# Patient Record
Sex: MALE | Race: WHITE | Employment: UNEMPLOYED | URBAN - METROPOLITAN AREA
[De-identification: names, ages, dates, MRNs, and addresses within clinical notes are randomized per-mention and may not be internally consistent; named-entity substitution may affect disease eponyms.]

---

## 2017-01-11 RX ORDER — ALPRAZOLAM 1 MG/1
1 TABLET ORAL 4 TIMES DAILY
Qty: 120 TABLET | Refills: 5 | Status: SHIPPED | OUTPATIENT
Start: 2017-01-11 | End: 2017-02-10

## 2017-01-16 ENCOUNTER — TELEPHONE (OUTPATIENT)
Dept: INTERNAL MEDICINE | Age: 62
End: 2017-01-16

## 2017-02-03 ENCOUNTER — TELEPHONE (OUTPATIENT)
Dept: INTERNAL MEDICINE CLINIC | Age: 62
End: 2017-02-03

## 2017-03-09 ENCOUNTER — TELEPHONE (OUTPATIENT)
Dept: INTERNAL MEDICINE | Age: 62
End: 2017-03-09

## 2017-03-14 ENCOUNTER — TELEPHONE (OUTPATIENT)
Dept: OTHER | Facility: CLINIC | Age: 62
End: 2017-03-14

## 2017-07-10 ENCOUNTER — TELEPHONE (OUTPATIENT)
Dept: INTERNAL MEDICINE CLINIC | Age: 62
End: 2017-07-10

## 2017-09-22 ENCOUNTER — TELEPHONE (OUTPATIENT)
Dept: INTERNAL MEDICINE | Age: 62
End: 2017-09-22

## 2017-09-28 ENCOUNTER — TELEPHONE (OUTPATIENT)
Dept: INTERNAL MEDICINE CLINIC | Age: 62
End: 2017-09-28

## 2017-11-22 ENCOUNTER — TELEPHONE (OUTPATIENT)
Dept: INTERNAL MEDICINE CLINIC | Age: 62
End: 2017-11-22

## 2017-12-13 ENCOUNTER — TELEPHONE (OUTPATIENT)
Dept: INTERNAL MEDICINE CLINIC | Age: 62
End: 2017-12-13

## 2018-01-16 RX ORDER — DRONABINOL 2.5 MG/1
2.5 CAPSULE ORAL
Qty: 180 CAPSULE | Refills: 0 | Status: SHIPPED | OUTPATIENT
Start: 2018-01-16 | End: 2018-04-16

## 2018-03-01 DIAGNOSIS — Z79.01 LONG TERM CURRENT USE OF ANTICOAGULANT: Primary | ICD-10-CM

## 2018-03-01 DIAGNOSIS — Z86.711 HISTORY OF PULMONARY EMBOLISM: ICD-10-CM

## 2018-04-16 DIAGNOSIS — F41.1 GENERALIZED ANXIETY DISORDER: Primary | ICD-10-CM

## 2018-04-16 RX ORDER — ALPRAZOLAM 1 MG/1
1 TABLET ORAL EVERY 6 HOURS
Qty: 120 TABLET | Refills: 5 | Status: SHIPPED | OUTPATIENT
Start: 2018-04-16 | End: 2018-08-20 | Stop reason: SDUPTHER

## 2018-05-01 ENCOUNTER — TELEPHONE (OUTPATIENT)
Dept: ORTHOPEDIC SURGERY | Age: 63
End: 2018-05-01

## 2018-08-20 DIAGNOSIS — F41.1 GENERALIZED ANXIETY DISORDER: ICD-10-CM

## 2018-08-20 RX ORDER — ALPRAZOLAM 1 MG/1
1 TABLET ORAL EVERY 6 HOURS
Qty: 120 TABLET | Refills: 5 | Status: SHIPPED | OUTPATIENT
Start: 2018-08-20 | End: 2019-08-20 | Stop reason: SDUPTHER

## 2019-08-20 DIAGNOSIS — F41.1 GENERALIZED ANXIETY DISORDER: ICD-10-CM

## 2019-08-20 RX ORDER — ALPRAZOLAM 1 MG/1
1 TABLET ORAL EVERY 6 HOURS
Qty: 120 TABLET | Refills: 5 | Status: SHIPPED | OUTPATIENT
Start: 2019-08-20 | End: 2020-03-25 | Stop reason: SDUPTHER

## 2019-11-17 PROBLEM — E78.5 DYSLIPIDEMIA: Status: ACTIVE | Noted: 2019-11-17

## 2019-11-17 PROBLEM — F42.2 MIXED OBSESSIONAL THOUGHTS AND ACTS: Status: ACTIVE | Noted: 2019-11-17

## 2020-03-25 RX ORDER — ALPRAZOLAM 1 MG/1
1 TABLET ORAL EVERY 6 HOURS
Qty: 120 TABLET | Refills: 5 | Status: SHIPPED | OUTPATIENT
Start: 2020-03-25 | End: 2020-09-20 | Stop reason: SDUPTHER

## 2020-07-05 ENCOUNTER — OUTSIDE SERVICES (OUTPATIENT)
Dept: INTERNAL MEDICINE CLINIC | Age: 65
End: 2020-07-05
Payer: MEDICARE

## 2020-07-05 VITALS
HEART RATE: 85 BPM | RESPIRATION RATE: 18 BRPM | DIASTOLIC BLOOD PRESSURE: 68 MMHG | OXYGEN SATURATION: 96 % | WEIGHT: 194 LBS | SYSTOLIC BLOOD PRESSURE: 95 MMHG | BODY MASS INDEX: 28.65 KG/M2 | TEMPERATURE: 97.8 F

## 2020-07-05 PROBLEM — E55.9 VITAMIN D DEFICIENCY: Status: ACTIVE | Noted: 2020-07-05

## 2020-07-05 PROBLEM — Z79.01 LONG TERM CURRENT USE OF ANTICOAGULANT: Status: RESOLVED | Noted: 2018-03-01 | Resolved: 2020-07-05

## 2020-07-05 PROCEDURE — 99308 SBSQ NF CARE LOW MDM 20: CPT | Performed by: INTERNAL MEDICINE

## 2020-07-05 RX ORDER — PRAVASTATIN SODIUM 40 MG
40 TABLET ORAL NIGHTLY
COMMUNITY

## 2020-07-05 RX ORDER — POLYETHYLENE GLYCOL 3350 17 G/17G
17 POWDER, FOR SOLUTION ORAL 2 TIMES DAILY PRN
COMMUNITY
End: 2020-10-29

## 2020-07-05 RX ORDER — PAROXETINE HYDROCHLORIDE 20 MG/1
20 TABLET, FILM COATED ORAL SEE ADMIN INSTRUCTIONS
COMMUNITY
End: 2020-10-29 | Stop reason: ALTCHOICE

## 2020-07-05 RX ORDER — ASPIRIN 81 MG/1
81 TABLET ORAL DAILY
COMMUNITY
End: 2021-02-13 | Stop reason: ALTCHOICE

## 2020-07-05 RX ORDER — PANTOPRAZOLE SODIUM 20 MG/1
20 TABLET, DELAYED RELEASE ORAL DAILY
COMMUNITY

## 2020-07-05 RX ORDER — LANOLIN ALCOHOL/MO/W.PET/CERES
6 CREAM (GRAM) TOPICAL NIGHTLY
COMMUNITY

## 2020-07-05 RX ORDER — QUETIAPINE FUMARATE 100 MG/1
100 TABLET, FILM COATED ORAL 2 TIMES DAILY
COMMUNITY

## 2020-07-05 RX ORDER — ACETAMINOPHEN 325 MG/1
650 TABLET ORAL EVERY 6 HOURS PRN
COMMUNITY

## 2020-07-05 RX ORDER — FINASTERIDE 5 MG/1
5 TABLET, FILM COATED ORAL DAILY
COMMUNITY

## 2020-07-05 NOTE — PROGRESS NOTES
[facility]) Physicians  Internal Medicine  Patient Encounter  Tamra Sánchez D.O., [facility]        Chief Complaint   Patient presents with   Robert Reyes    Medication Check       HPI: 59 y.o. male seen today for his routine 2-month check. Patient's clinical status remains unchanged. He remains seclusive in his room but does go for walks and spent some time outside. He has continued intermittent periods of agitation. He also has frequent episodes of excessive thoughts and actions. We will call the staff repetitively but this usually occurs when he is agitated  or wants something. He has very little patience and does not like to wait. He offers no other specific complaints. He eats all meals in his room. He is able to toilet himself independently. He has not had any recent falls. Specifically he denies any chest pain, shortness of breath, lightheadedness or syncopal episodes. He denies any abdominal pain, nausea, vomiting, diarrhea. He does have a history of urinary retention but he denies any recent problems with urination. Medication reconciliation performed. His Paxil dose was listed as 80 mg in the morning and 20 mg at bedtime. It seems that psychiatry had him on 40 mg twice daily and 20 mg at bedtime. We will need to confirm with psychiatry his dosing regimen. Additionally his insurance company has indicated that Xanax is actually not on the formulary. He continues to receive the medicine as prescribed.         Past Medical History:   Diagnosis Date    CIDP (chronic inflammatory demyelinating polyneuropathy) (HCC)     Clostridium difficile colitis 2017    Constipation     Deep vein thrombosis (DVT) of left lower extremity ([facility] [address] 08/2018    Dementia with behavioral disturbance (HCC)     Depression with anxiety     Discitis of cervical region 08/2017    Dyslipidemia     E coli bacteremia 01/24/2020    AMARJIT (generalized anxiety disorder)     OCD (obsessive compulsive disorder)     Overactive bladder     Primary insomnia     Pulmonary embolism ([facility] [address] 08/2017    Pyelonephritis of right kidney 01/24/2020    with sepsis    Rhabdomyolysis 11/05/2016    Urinary retention 11/8/2016    Urinary retention 11/05/2016    Vitamin D deficiency          MEDICATIONS:  Medication Sig   aspirin 81 MG EC tablet Take 81 mg by mouth daily   Calcium Carbonate Antacid 400 MG CHEW Take 1 tablet by mouth 2 times daily   finasteride (PROSCAR) 5 MG tablet Take 5 mg by mouth daily   melatonin 3 MG TABS tablet Take 6 mg by mouth nightly   polyethylene glycol (GLYCOLAX) 17 GM/SCOOP powder Take 17 g by mouth 2 times daily as needed   pantoprazole (PROTONIX) 20 MG tablet Take 20 mg by mouth daily   PARoxetine (PAXIL) 20 MG tablet Take 20 mg by mouth See Admin Instructions 40 mg BID and 20 mg at hs   pravastatin (PRAVACHOL) 40 MG tablet Take 40 mg by mouth nightly   QUEtiapine (SEROQUEL) 100 MG tablet Take 100 mg by mouth 2 times daily   acetaminophen (TYLENOL) 325 MG tablet Take 650 mg by mouth every 6 hours as needed for Pain   vitamin D (CHOLECALCIFEROL) 25 MCG (1000 UT) TABS tablet Take 1,000 Units by mouth daily   ALPRAZolam (XANAX) 1 MG tablet Take 1 tablet by mouth every 6 hours for 180 days. .  May take 1 additional tab as needed for anxiety   Multiple Vitamins-Minerals (CENTRUM SILVER) TABS Take by mouth daily           Review of Systems - As per HPI  GEN: Pt denies fever, chills or night sweats. Denies weight changes. Appetite good  HEENT: Pt denies visual and auditory changes, epistaxis, upper respiratory symptoms and dysphagia  CV: Pt denies CP, SOB, [place], orthopnea palpitations, LE swelling, and claudication. PULM: Pt denies cough, wheezing or sputum production  GI: Pt denies N/V/D, heart burn, rectal bleeding, or melena. NEURO: Pt denies unilateral weakness, paresthesia and dizziness.     OBJECTIVE:  BP 95/68   Pulse 85   Temp 97.8 °F (36.6 °C)   Resp 18   Wt 194 lb (88 kg)   SpO2 96%   BMI 28.65 kg/m²   GEN: NAD, A&O, Non-toxic. Follows commands. HEENT: NC/AT, PARISH, EOMI, Oral cavity Clear,  Mucous membranes moist.   NECK: Supple. No thyromegaly. No JVD  LYMPH: No C/SC nodes. CV: S1 S2 NL, RRR. No murmurs, clicks or rubs. PULM: CTA, symmentric air exchange  EXT: No edema. GI: Abdomen is soft, NT, BS+, No hepatomegaly. No masses. NEURO: BL LE weakness. BL LE muscle atrophy. Gait not assessed today. However he has been observed walking in the hallway with his walker. He slaps his feet when walking. VASC:  No carotid bruits. Pulses symmetric  SKIN:  No rashes or lesions of concern    ASSESSMENT[de-identified]  Page Coronel was seen today for check-up and medication check. Diagnoses and all orders for this visit:    CIDP (chronic inflammatory demyelinating polyneuropathy) ([facility] [address]    Generalized anxiety disorder    Dyslipidemia    Mixed obsessional thoughts and acts    Dementia with behavioral disturbance, unspecified dementia type ([facility] [address]    Vitamin D deficiency    Hypotension, unspecified hypotension type        Additional Plan:  1. Lab tests next visit  2. Push fluids  3. Continue to monitor BP. 4. Confirm with psych his Paxil dose. Time-- 35 minutes    Discussed medications with patient who voiced understanding of their use, indication and potential side effects. Pt also understands the above recommendations. All questions answered. This note was generated completely or in part utilizing Dragon dictation speech recognition software. Occasionally, words are mistranscribed and despite editing, the text may contain inaccuracies due to incorrect word recognition.   If further clarification is needed please contact the office at [phone]         Electronically signed    Elena Easley D.O.

## 2020-09-20 RX ORDER — ALPRAZOLAM 1 MG/1
1 TABLET ORAL EVERY 6 HOURS
Qty: 120 TABLET | Refills: 5 | Status: SHIPPED | OUTPATIENT
Start: 2020-09-20 | End: 2021-03-28 | Stop reason: SDUPTHER

## 2020-10-29 ENCOUNTER — OUTSIDE SERVICES (OUTPATIENT)
Dept: INTERNAL MEDICINE CLINIC | Age: 65
End: 2020-10-29
Payer: MEDICARE

## 2020-10-29 VITALS
OXYGEN SATURATION: 95 % | HEART RATE: 68 BPM | DIASTOLIC BLOOD PRESSURE: 68 MMHG | WEIGHT: 197 LBS | BODY MASS INDEX: 29.09 KG/M2 | TEMPERATURE: 98.1 F | SYSTOLIC BLOOD PRESSURE: 104 MMHG | RESPIRATION RATE: 18 BRPM

## 2020-10-29 PROCEDURE — 99308 SBSQ NF CARE LOW MDM 20: CPT | Performed by: INTERNAL MEDICINE

## 2020-10-29 RX ORDER — PAROXETINE HYDROCHLORIDE 40 MG/1
60 TABLET, FILM COATED ORAL EVERY MORNING
COMMUNITY
End: 2020-10-29 | Stop reason: CLARIF

## 2020-10-29 RX ORDER — PHENOL 1.4 %
1 AEROSOL, SPRAY (ML) MUCOUS MEMBRANE 2 TIMES DAILY
Status: ON HOLD | COMMUNITY
End: 2021-02-15 | Stop reason: CLARIF

## 2020-10-29 RX ORDER — PAROXETINE HYDROCHLORIDE 20 MG/1
20 TABLET, FILM COATED ORAL SEE ADMIN INSTRUCTIONS
Status: ON HOLD | COMMUNITY
End: 2021-02-15 | Stop reason: CLARIF

## 2020-10-29 RX ORDER — POLYETHYLENE GLYCOL 3350 17 G/17G
17 POWDER, FOR SOLUTION ORAL DAILY PRN
COMMUNITY

## 2020-10-29 NOTE — PROGRESS NOTES
[facility]) Physicians  Internal Medicine  Patient Encounter  Nolberto Mayen D.O., [facility]        Chief Complaint   Patient presents with   Michelle Ayala    Medication Check       HPI: 59 y.o. male seen today for his routine checkup regarding status of current chronic medical issues listed below along with a medication review and reconciliation. Overall, his clinical status has remained about the same. He has been increasingly more agitated due to the pandemic and increased social isolation. His movement in and out of his room has been restricted which causes increased irritation and agitation. He does get out about 10 to 15 minutes daily for a walk. Staff are required to chaperone. Patient states he is going crazy being \"locked up in his room. \"  He wants to be able to get out and walk when he wants. Patient was recently seen by psychiatry. His Paxil dose was reduced to 60 mg in the morning and 20 mg in the evening. His Seroquel dose and Xanax dose remain the same. He has not needed the additional as needed dose of Xanax during the day. Staff report that he tolerates the medication well. He is not found to be overly sedated. Patient also expressed some frustration over not being able to see his family. He is also irritated by the fact that they will not buy him again. Otherwise, he eats well. He denies any problems with abdominal pain, nausea, vomiting, diarrhea. He denies any chest pain or shortness of breath. He denies any neck or back problems. Patient also denies any dysuria or urinary incontinence. Patient does state that he feels constipated but denies any bloating. He does have an order for MiraLAX as needed but it does not look like he has been tested for it. He is not on a stool softener.       Past Medical History:   Diagnosis Date    CIDP (chronic inflammatory demyelinating polyneuropathy) ([facility])     Clostridium difficile colitis 2017    Constipation     Deep vein BP Readings from Last 3 Encounters:   10/29/20 104/68   07/05/20 95/68   11/11/16 119/65      GEN: NAD, A&O, Non-toxic. Follows commands. HEENT: NC/AT, PARISH, EOMI, Oral cavity Clear,  Mucous membranes moist.    NECK: Supple. No thyromegaly. No JVD  LYMPH: No C/SC nodes. CV: S1 S2 NL, RRR. No murmurs, clicks or rubs. PULM: CTA, symmentric air exchange  EXT: No edema. GI: Abdomen is soft, NT, BS+, No hepatomegaly. No masses. NEURO: BL LE weakness. BL LE muscle atrophy. VASC:  No carotid bruits. Pedal pulses symmetrical  SKIN:  No rashes    ASSESSMENT[de-identified]  Greyson Cardoso was seen today for check-up and medication check. Diagnoses and all orders for this visit:    CIDP (chronic inflammatory demyelinating polyneuropathy) ([facility] [address]    Dyslipidemia    Vitamin D deficiency    Dementia with behavioral disturbance, unspecified dementia type ([facility] [address]    Mixed obsessional thoughts and acts    Generalized anxiety disorder    Nursing home resident    Screen for colon cancer    Need for hepatitis C screening test        Additional Plan:  1. LAB with Hep C Antibody  2. Stool FIT for colon cancer screening if he allows. With Wayne's comorbidities, putting him through a colonoscopy would likely cause undue anxiety and distress. Will screen for colon cancer via fecal immunochemical test.  If positive will need to consider colonoscopy for further screening        Discussed medications with patient who voiced understanding of their use, indication and potential side effects. Pt also understands the above recommendations. All questions answered. This note was generated completely or in part utilizing Dragon dictation speech recognition software. Occasionally, words are mistranscribed and despite editing, the text may contain inaccuracies due to incorrect word recognition.   If further clarification is needed please contact the office at [phone]         Electronically signed    Renee Duong D.O.

## 2020-11-02 LAB — HEP C VIRUS AB: NONREACTIVE

## 2020-11-23 LAB — FECAL BLOOD IMMUNOCHEMICAL TEST: NORMAL

## 2020-11-27 ENCOUNTER — OUTSIDE SERVICES (OUTPATIENT)
Dept: INTERNAL MEDICINE CLINIC | Age: 65
End: 2020-11-27
Payer: MEDICARE

## 2020-11-27 VITALS — SYSTOLIC BLOOD PRESSURE: 110 MMHG | DIASTOLIC BLOOD PRESSURE: 60 MMHG | HEART RATE: 70 BPM

## 2020-11-27 PROCEDURE — 99308 SBSQ NF CARE LOW MDM 20: CPT | Performed by: INTERNAL MEDICINE

## 2020-11-27 NOTE — PROGRESS NOTES
[facility]) Physicians  Internal Medicine  Patient Encounter  Shaye Davis D.O., [facility]         Chief Complaint   Patient presents with    Results       HPI: 59 y.o. male seen today to review lab tests and stool tests. The patient has a mild microcytic anemia. His hemoglobin was normal in August 2020 at 14.5. In September a month later his hemoglobin had dropped to 12.9 with an MCV of 79. In October he dropped further with a hemoglobin of 11.7. His hemoglobin improved slightly on 11/6/2000 2212.3 with an MCV of 79. FIT was positive. Questioning the patient he states he occasionally sees a drop of blood on the toilet tissue or in the water. He thinks he has hemorrhoids. He does state that he had a colonoscopy many years ago perhaps when he was around 48years old. He has not had a colonoscopy since. He denies any appetite problems or weight loss. He denies abdominal pain. Past Medical History:   Diagnosis Date    CIDP (chronic inflammatory demyelinating polyneuropathy) (HCC)     Clostridium difficile colitis 2017    Constipation     Deep vein thrombosis (DVT) of left lower extremity ([address] 08/2018    Dementia with behavioral disturbance ([address]     Depression with anxiety     Discitis of cervical region 08/2017    Dyslipidemia     E coli bacteremia 01/24/2020    AMARJIT (generalized anxiety disorder)     OCD (obsessive compulsive disorder)     Overactive bladder     Primary insomnia     Pulmonary embolism ([address] 08/2017    Pyelonephritis of right kidney 01/24/2020    with sepsis    Rhabdomyolysis 11/05/2016    Urinary retention 11/05/2016    Vitamin D deficiency          MEDICATIONS:  Medication Sig   polyethylene glycol (GLYCOLAX) 17 GM/SCOOP powder Take 17 g by mouth daily as needed   calcium carbonate 600 MG TABS tablet Take 1 tablet by mouth 2 times daily   PARoxetine (PAXIL) 20 MG tablet Take 20 mg by mouth See Admin Instructions Take 3 in the morning and 1 in the evening. ALPRAZolam (XANAX) 1 MG tablet Take 1 tablet by mouth every 6 hours for 180 days. .  May take 1 additional tab as needed for anxiety   aspirin 81 MG EC tablet Take 81 mg by mouth daily   finasteride (PROSCAR) 5 MG tablet Take 5 mg by mouth daily   melatonin 3 MG TABS tablet Take 6 mg by mouth nightly   pantoprazole (PROTONIX) 20 MG tablet Take 20 mg by mouth daily   pravastatin (PRAVACHOL) 40 MG tablet Take 40 mg by mouth nightly   QUEtiapine (SEROQUEL) 100 MG tablet Take 100 mg by mouth 2 times daily   acetaminophen (TYLENOL) 325 MG tablet Take 650 mg by mouth every 6 hours as needed for Pain   vitamin D (CHOLECALCIFEROL) 25 MCG (1000 UT) TABS tablet Take 1,000 Units by mouth daily   Multiple Vitamins-Minerals (CENTRUM SILVER) TABS Take by mouth daily           Review of Systems - As per HPI      OBJECTIVE:  /60   Pulse 70   GEN: NAD, A&O, Non-toxic  HEENT: NC/AT, PARISH, EOMI, nbo conjuntival pallor,   EXT: No C/C/E  GI: Abdomen is soft, NT, BS+, No hepatomegaly. No masses    ASSESSMENT[de-identified]  Blanquita Mata was seen today for results. Diagnoses and all orders for this visit:    Positive FIT (fecal immunochemical test)    Microcytic anemia        Additional Plan:  1. Schedule Colonoscopy in the next few months. Spoke with pt. He is in favor  2. Continue serial lab  3. May need iron supplement      20 minutes spent with the pt.  >50% spent reviewing test results and discussing plan of care and counseled on anemia, colon cancer screenning. Spoke with family and advised of plan of care. They agree. This note was generated completely or in part utilizing Dragon dictation speech recognition software. Occasionally, words are mistranscribed and despite editing, the text may contain inaccuracies due to incorrect word recognition.   If further clarification is needed please contact the office at [phone]       Electronically signed    Lauren Bradford D.O.

## 2021-01-03 ENCOUNTER — OUTSIDE SERVICES (OUTPATIENT)
Dept: INTERNAL MEDICINE CLINIC | Age: 66
End: 2021-01-03
Payer: MEDICARE

## 2021-01-03 VITALS
WEIGHT: 197 LBS | RESPIRATION RATE: 17 BRPM | SYSTOLIC BLOOD PRESSURE: 126 MMHG | HEART RATE: 79 BPM | OXYGEN SATURATION: 96 % | BODY MASS INDEX: 29.09 KG/M2 | TEMPERATURE: 97.7 F | DIASTOLIC BLOOD PRESSURE: 74 MMHG

## 2021-01-03 DIAGNOSIS — F41.1 GENERALIZED ANXIETY DISORDER: ICD-10-CM

## 2021-01-03 DIAGNOSIS — R19.5 POSITIVE FIT (FECAL IMMUNOCHEMICAL TEST): ICD-10-CM

## 2021-01-03 DIAGNOSIS — F03.91 DEMENTIA WITH BEHAVIORAL DISTURBANCE, UNSPECIFIED DEMENTIA TYPE: ICD-10-CM

## 2021-01-03 DIAGNOSIS — F42.2 MIXED OBSESSIONAL THOUGHTS AND ACTS: ICD-10-CM

## 2021-01-03 DIAGNOSIS — E78.5 DYSLIPIDEMIA: ICD-10-CM

## 2021-01-03 DIAGNOSIS — G61.81 CIDP (CHRONIC INFLAMMATORY DEMYELINATING POLYNEUROPATHY) (HCC): Primary | ICD-10-CM

## 2021-01-03 DIAGNOSIS — E78.5 HYPERLIPIDEMIA, UNSPECIFIED HYPERLIPIDEMIA TYPE: ICD-10-CM

## 2021-01-03 DIAGNOSIS — D50.9 MICROCYTIC ANEMIA: ICD-10-CM

## 2021-01-03 PROCEDURE — 99308 SBSQ NF CARE LOW MDM 20: CPT | Performed by: INTERNAL MEDICINE

## 2021-01-03 NOTE — PROGRESS NOTES
[facility]) Physicians  Internal Medicine  Patient Encounter  Mauro Alves D.O., [facility]        Chief Complaint   Patient presents with   Kathleen Saavedra    Medication Check       HPI: 72 y.o. male seen today for his routine checkup regarding status of current chronic medical issues listed below along with a medication review and reconciliation. Pt states he is feeling about the same. No new changes reported by staff. Depression/anxiety/OCD--patient is currently under the care of Dr. Ernesto Zuniga. He remains on Xanax and Paxil as well as Seroquel. He has not had any adverse effects. The patient has undergone full neuropsychological testing done on 4/27/2018 with a formal diagnosis of frontotemporal lobe dementia due to chronic inflammatory demyelinating polyneuropathy as well as an unspecified anxiety disorder. Additionally the patient has diagnoses of obsessive-compulsive disorder. Generally, the patient admits to feeling depressed. He does not like the social isolation that the coronavirus pandemic has created. CIDP--Patient has not had any falls lately. He does have ongoing lower extremity weakness but manages to ambulate with his walker in his room. He has not been out of his room much like he used to. BPH/lower urinary tract obstruction--Patient has a history of urinary retention. He does report urinary frequency but has not had any issues with retention in the last couple of years. He remains on finasteride. He is not on Flomax at this time    GERD--Patient denies any heartburn or dysphagia. He is on Protonix 20 mg daily. This works well. Hyperlipidemia--patient remains on pravastatin without adverse side effects. He denies any new myalgias. Chronic constipation--patient is doing well with the MiraLAX. He takes 17 g daily. Denies any abdominal pain, nausea, bloating, melena. He does occasionally have blood on the stool. Multiple Vitamins-Minerals (CENTRUM SILVER) TABS Take by mouth daily              Review of Systems - As per HPI      OBJECTIVE:  Vitals:    01/03/21 1120   BP: 126/74   Pulse: 79   Resp: 17   Temp: 97.7 °F (36.5 °C)   SpO2: 96%   Weight: 197 lb (89.4 kg)     Body mass index is 29.09 kg/m². Wt Readings from Last 3 Encounters:   01/03/21 197 lb (89.4 kg)   10/29/20 197 lb (89.4 kg)   07/05/20 194 lb (88 kg)     BP Readings from Last 3 Encounters:   01/03/21 126/74   11/27/20 110/60   10/29/20 104/68      GEN: NAD, A&O, Non-toxic. Follows commands. HEENT:  NC/AT, PARISH, EOMI, oral cavity clear, tongue midline. NECK: Supple. No thyromegaly. No masses. No JVD  LYMPH: No C/SC nodes. CV: Regular rhythm. Rate normal.  No murmurs. No ectopy. PULM: CTA  EXT: No edema. GI: Abdomen is soft, NT, BS+, No hepatomegaly. No masses. NEURO: BL LE weakness. BL LE muscle atrophy. No changes   VASC:  No carotid bruits. Pedal pulses symmetrical  SKIN:  No rashes  PSYCH: Mood is depressed. Affect flat. Psychomotor activity is depressed. ASSESSMENT[de-identified]  Aileen Pelaez was seen today for check-up and medication check. Diagnoses and all orders for this visit:    CIDP (chronic inflammatory demyelinating polyneuropathy) ([facility] [address]    Dementia with behavioral disturbance, unspecified dementia type ([facility] [address]    Generalized anxiety disorder    Mixed obsessional thoughts and acts    Dyslipidemia    Microcytic anemia    Positive FIT (fecal immunochemical test)    Hyperlipidemia, unspecified hyperlipidemia type        Additional Plan:  1. Await GI consultation and colonoscopy  2. LAB in Feb  3. Psychotropics per Dr. Saúl Mckeon        Discussed medications with patient who voiced understanding of their use, indication and potential side effects. Pt also understands the above recommendations. All questions answered. This note was generated completely or in part utilizing Dragon dictation speech recognition software. Occasionally, words are mistranscribed and despite editing, the text may contain inaccuracies due to incorrect word recognition.   If further clarification is needed please contact the office at [phone]         Electronically signed    Sobia Diehl D.O.

## 2021-02-02 ENCOUNTER — ANESTHESIA EVENT (OUTPATIENT)
Dept: ENDOSCOPY | Age: 66
End: 2021-02-02
Payer: MEDICARE

## 2021-02-03 ENCOUNTER — ANESTHESIA (OUTPATIENT)
Dept: ENDOSCOPY | Age: 66
End: 2021-02-03
Payer: MEDICARE

## 2021-02-03 ENCOUNTER — HOSPITAL ENCOUNTER (OUTPATIENT)
Age: 66
Setting detail: OUTPATIENT SURGERY
Discharge: HOME | End: 2021-02-03
Attending: INTERNAL MEDICINE | Admitting: INTERNAL MEDICINE
Payer: MEDICARE

## 2021-02-03 VITALS — DIASTOLIC BLOOD PRESSURE: 55 MMHG | SYSTOLIC BLOOD PRESSURE: 97 MMHG | OXYGEN SATURATION: 98 %

## 2021-02-03 VITALS
DIASTOLIC BLOOD PRESSURE: 76 MMHG | HEART RATE: 71 BPM | RESPIRATION RATE: 18 BRPM | SYSTOLIC BLOOD PRESSURE: 147 MMHG | OXYGEN SATURATION: 98 %

## 2021-02-03 DIAGNOSIS — R19.5 POSITIVE FIT (FECAL IMMUNOCHEMICAL TEST): ICD-10-CM

## 2021-02-03 PROCEDURE — 88305 TISSUE EXAM BY PATHOLOGIST: CPT

## 2021-02-03 PROCEDURE — 7100000011 HC PHASE II RECOVERY - ADDTL 15 MIN: Performed by: INTERNAL MEDICINE

## 2021-02-03 PROCEDURE — 6360000002 HC RX W HCPCS: Performed by: NURSE ANESTHETIST, CERTIFIED REGISTERED

## 2021-02-03 PROCEDURE — 2709999900 HC NON-CHARGEABLE SUPPLY: Performed by: INTERNAL MEDICINE

## 2021-02-03 PROCEDURE — 2500000003 HC RX 250 WO HCPCS: Performed by: NURSE ANESTHETIST, CERTIFIED REGISTERED

## 2021-02-03 PROCEDURE — 3700000000 HC ANESTHESIA ATTENDED CARE: Performed by: INTERNAL MEDICINE

## 2021-02-03 PROCEDURE — 2580000003 HC RX 258: Performed by: NURSE ANESTHETIST, CERTIFIED REGISTERED

## 2021-02-03 PROCEDURE — 7100000010 HC PHASE II RECOVERY - FIRST 15 MIN: Performed by: INTERNAL MEDICINE

## 2021-02-03 PROCEDURE — 3700000001 HC ADD 15 MINUTES (ANESTHESIA): Performed by: INTERNAL MEDICINE

## 2021-02-03 PROCEDURE — 3609010600 HC COLONOSCOPY POLYPECTOMY SNARE/COLD BIOPSY: Performed by: INTERNAL MEDICINE

## 2021-02-03 RX ORDER — SODIUM CHLORIDE, SODIUM LACTATE, POTASSIUM CHLORIDE, CALCIUM CHLORIDE 600; 310; 30; 20 MG/100ML; MG/100ML; MG/100ML; MG/100ML
INJECTION, SOLUTION INTRAVENOUS CONTINUOUS
Status: DISCONTINUED | OUTPATIENT
Start: 2021-02-03 | End: 2021-02-03 | Stop reason: HOSPADM

## 2021-02-03 RX ORDER — PROPOFOL 10 MG/ML
INJECTION, EMULSION INTRAVENOUS PRN
Status: DISCONTINUED | OUTPATIENT
Start: 2021-02-03 | End: 2021-02-03 | Stop reason: SDUPTHER

## 2021-02-03 RX ORDER — SODIUM CHLORIDE 0.9 % (FLUSH) 0.9 %
10 SYRINGE (ML) INJECTION PRN
Status: DISCONTINUED | OUTPATIENT
Start: 2021-02-03 | End: 2021-02-03 | Stop reason: HOSPADM

## 2021-02-03 RX ORDER — SODIUM CHLORIDE 9 MG/ML
INJECTION, SOLUTION INTRAVENOUS CONTINUOUS
Status: DISCONTINUED | OUTPATIENT
Start: 2021-02-03 | End: 2021-02-03 | Stop reason: HOSPADM

## 2021-02-03 RX ORDER — LIDOCAINE HYDROCHLORIDE 20 MG/ML
INJECTION, SOLUTION INFILTRATION; PERINEURAL PRN
Status: DISCONTINUED | OUTPATIENT
Start: 2021-02-03 | End: 2021-02-03 | Stop reason: SDUPTHER

## 2021-02-03 RX ORDER — SODIUM CHLORIDE, SODIUM LACTATE, POTASSIUM CHLORIDE, CALCIUM CHLORIDE 600; 310; 30; 20 MG/100ML; MG/100ML; MG/100ML; MG/100ML
INJECTION, SOLUTION INTRAVENOUS CONTINUOUS PRN
Status: DISCONTINUED | OUTPATIENT
Start: 2021-02-03 | End: 2021-02-03 | Stop reason: SDUPTHER

## 2021-02-03 RX ORDER — SODIUM CHLORIDE 0.9 % (FLUSH) 0.9 %
10 SYRINGE (ML) INJECTION EVERY 12 HOURS SCHEDULED
Status: DISCONTINUED | OUTPATIENT
Start: 2021-02-03 | End: 2021-02-03 | Stop reason: HOSPADM

## 2021-02-03 RX ADMIN — PROPOFOL 20 MG: 10 INJECTION, EMULSION INTRAVENOUS at 08:43

## 2021-02-03 RX ADMIN — PHENYLEPHRINE HYDROCHLORIDE 50 MCG: 10 INJECTION, SOLUTION INTRAMUSCULAR; INTRAVENOUS; SUBCUTANEOUS at 08:32

## 2021-02-03 RX ADMIN — SODIUM CHLORIDE, SODIUM LACTATE, POTASSIUM CHLORIDE, AND CALCIUM CHLORIDE: .6; .31; .03; .02 INJECTION, SOLUTION INTRAVENOUS at 08:11

## 2021-02-03 RX ADMIN — PHENYLEPHRINE HYDROCHLORIDE 50 MCG: 10 INJECTION, SOLUTION INTRAMUSCULAR; INTRAVENOUS; SUBCUTANEOUS at 08:37

## 2021-02-03 RX ADMIN — PROPOFOL 50 MG: 10 INJECTION, EMULSION INTRAVENOUS at 08:26

## 2021-02-03 RX ADMIN — PROPOFOL 40 MG: 10 INJECTION, EMULSION INTRAVENOUS at 08:36

## 2021-02-03 RX ADMIN — PROPOFOL 50 MG: 10 INJECTION, EMULSION INTRAVENOUS at 08:21

## 2021-02-03 RX ADMIN — PROPOFOL 50 MG: 10 INJECTION, EMULSION INTRAVENOUS at 08:17

## 2021-02-03 RX ADMIN — PROPOFOL 40 MG: 10 INJECTION, EMULSION INTRAVENOUS at 08:32

## 2021-02-03 RX ADMIN — PROPOFOL 100 MG: 10 INJECTION, EMULSION INTRAVENOUS at 08:11

## 2021-02-03 RX ADMIN — PROPOFOL 50 MG: 10 INJECTION, EMULSION INTRAVENOUS at 08:13

## 2021-02-03 RX ADMIN — LIDOCAINE HYDROCHLORIDE 50 MG: 20 INJECTION, SOLUTION INFILTRATION; PERINEURAL at 08:11

## 2021-02-03 RX ADMIN — PHENYLEPHRINE HYDROCHLORIDE 50 MCG: 10 INJECTION, SOLUTION INTRAMUSCULAR; INTRAVENOUS; SUBCUTANEOUS at 08:17

## 2021-02-03 ASSESSMENT — PULMONARY FUNCTION TESTS
PIF_VALUE: 1
PIF_VALUE: 0
PIF_VALUE: 1
PIF_VALUE: 0
PIF_VALUE: 0
PIF_VALUE: 1
PIF_VALUE: 0
PIF_VALUE: 1
PIF_VALUE: 0
PIF_VALUE: 1

## 2021-02-03 ASSESSMENT — PAIN - FUNCTIONAL ASSESSMENT
PAIN_FUNCTIONAL_ASSESSMENT: 0-10
PAIN_FUNCTIONAL_ASSESSMENT: 0-10

## 2021-02-03 NOTE — ANESTHESIA POSTPROCEDURE EVALUATION
Department of Anesthesiology  Postprocedure Note    Patient: Thania Stevens  MRN: [id number]  [place]: 1955  Date of evaluation: 2/3/2021  Time:  10:46 AM     Procedure Summary     Date: 02/03/21 Room / Location: [place] [address] / [facility]    Anesthesia Start: 9925 Anesthesia Stop: 1273    Procedure: COLONOSCOPY POLYPECTOMY SNARE/COLD BIOPSY (N/A ) Diagnosis:       Positive FIT (fecal immunochemical test)      (Positive FIT (fecal immunochemical test) [R19.5])    Surgeons: Nichelle Camacho MD Responsible Provider: Teetee Wellington MD    Anesthesia Type: MAC ASA Status: 3          Anesthesia Type: MAC    Castro Phase I: Castro Score: 10    Castro Phase II: Castro Score: 8    Last vitals: Reviewed and per EMR flowsheets.        Anesthesia Post Evaluation    Patient participation: complete - patient participated  Level of consciousness: awake  Airway patency: patent  Nausea & Vomiting: no nausea and no vomiting  Complications: no  Cardiovascular status: hemodynamically stable  Respiratory status: acceptable  Hydration status: stable

## 2021-02-03 NOTE — PROGRESS NOTES
Pt requested that this writer telephone his brother to update him on findings of the procedure today and need of future surgery to have large polyp removed. This writer telephoned brother, Martin Burr, and updated as requested. This writer also telephoned [employer] at [facility] to give report. Transportation telephoned. Informed that they would not be available to pick pt up until close to noon. Pt and [facility] informed.

## 2021-02-03 NOTE — PROGRESS NOTES
Verified with [id number] [facility] about pt's diet yesterday and Angel Gonzalez the nurse said, pt was on clears yesterday. Neither ate breakfast and dinner too as what pt said. Likewise, the nurse Angel Gonzalez will fax the COVID test result shortly.

## 2021-02-03 NOTE — PROCEDURES
[address] [place] [address]  Colonoscopy Note    Patient: Wilfredo Howard  : 1955  Acct#:     Procedure: Colonoscopy with biopsy, polypectomy (cold snare), injection for tattooing     Date:  2/3/2021    Surgeon:  Norman Rodriguez MD    Referring Physician:  Mikhail Boudreaux MD    Anesthesia: IV propofol, per anesthesia    EBL: <50 mL    Indications: This is a 72y.o. year old male who presents today for colonoscopy for + FIT test.     Procedure: An informed consent was obtained from the patient after explanation of indications, benefits, possible risks and complications of the procedure. The patient was then taken to the endoscopy suite, placed in the left lateral decubitus position, and the above IV anesthesia was administered. A digital rectal examination was performed and revealed negative without mass, lesions or tenderness. The Olympus [id number]-H190 video colonoscope was placed in the patient's rectum under digital direction and advanced to the cecum. The cecum was identified by characteristic anatomy and ballottment. The prep was adequate. Findings: The scope was then withdrawn back through the colon. A 4 mm sessile polyp was found at the hepatic flexure and was removed via cold snare polypectomy. A 5 cm broad-based polypoid lesion was found in the transverse colon. The base of the lesion was explored and was determined to be too broad-based for resection. Extensive biopsies were obtained and the location was marked with injection of [place] ink for tattooing. A 5 mm sessile polyp was found in the distal transverse colon was removed via cold snare polypectomy. A 2 mm sessile polyp was found in the rectum and was removed via cold snare polypectomy. The scope was then withdrawn into the rectum and retroflexed. The retroflexed view of the anal verge and rectum was normal.     The scope was straightened, the colon was decompressed and the scope was withdrawn from the patient. The patient tolerated the procedure well and was taken to the PACU in good condition. Biopsies: Yes. Impression:    1. A 4 mm sessile polyp was found at the hepatic flexure and was removed via cold snare polypectomy. 2. A 5 cm broad-based polypoid lesion was found in the transverse colon. The base of the lesion was explored and was determined to be too broad-based for resection. Extensive biopsies were obtained and the location was marked with injection of [place] ink for tattooing. 3. A 5 mm sessile polyp was found in the distal transverse colon was removed via cold snare polypectomy. 4. A 2 mm sessile polyp was found in the rectum and was removed via cold snare polypectomy. Recommendations:    Await pathology. Consult surgery.          Linnette Shaffer MD, MSc  [address]  2/3/2021

## 2021-02-03 NOTE — H&P
[facility]   Pre-operative History and Physical    Patient: Debbie Willson  : 1955  CSN:     History Obtained From:   Patient or guardian. HISTORY OF PRESENT ILLNESS:    The patient is a 72 y.o. male here for colonoscopy for + FIT test.     Past Medical History:    Past Medical History:   Diagnosis Date    CIDP (chronic inflammatory demyelinating polyneuropathy) ([address]     Clostridium difficile colitis     Constipation     Deep vein thrombosis (DVT) of left lower extremity ([address] 2018    [facility]     Depression with anxiety     Discitis of cervical region 2017    Dyslipidemia     E coli bacteremia 2020    AMARJIT (generalized anxiety disorder)     OCD (obsessive compulsive disorder)     Overactive bladder     Primary insomnia     Pulmonary embolism ([facility] 2017    Pyelonephritis of right kidney 2020    with sepsis    Rhabdomyolysis 2016    Urinary retention 2016    Vitamin D deficiency      Past Surgical History:    Past Surgical History:   Procedure Laterality Date    CERVICAL FUSION  2017    C3-T1 Laminectomy with fusion    HERNIA REPAIR      R side    OTHER SURGICAL HISTORY  11/10/2016    CYSTOSCOPY     Medications Prior to Admission:   No current facility-administered medications on file prior to encounter. Current Outpatient Medications on File Prior to Encounter   Medication Sig Dispense Refill    ALPRAZolam (XANAX) 1 MG tablet Take 1 tablet by mouth every 6 hours for 180 days. .  May take 1 additional tab as needed for anxiety 120 tablet 5    polyethylene glycol (GLYCOLAX) 17 GM/SCOOP powder Take 17 g by mouth daily as needed      calcium carbonate 600 MG TABS tablet Take 1 tablet by mouth 2 times daily      PARoxetine (PAXIL) 20 MG tablet Take 20 mg by mouth See Admin Instructions Take 3 in the morning and 1 in the evening.       aspirin 81 MG EC tablet Take 81 mg by mouth daily      finasteride (PROSCAR) 5 MG tablet Take 5 mg by mouth daily      melatonin 3 MG TABS tablet Take 6 mg by mouth nightly      pantoprazole (PROTONIX) 20 MG tablet Take 20 mg by mouth daily      pravastatin (PRAVACHOL) 40 MG tablet Take 40 mg by mouth nightly      QUEtiapine (SEROQUEL) 100 MG tablet Take 100 mg by mouth 2 times daily      acetaminophen (TYLENOL) 325 MG tablet Take 650 mg by mouth every 6 hours as needed for Pain      vitamin D (CHOLECALCIFEROL) 25 MCG (1000 UT) TABS tablet Take 1,000 Units by mouth daily      Multiple Vitamins-Minerals (CENTRUM SILVER) TABS Take by mouth daily          Allergies:  Patient has no known allergies. Social History:   Social History     Tobacco Use    Smoking status: Never Smoker    Smokeless tobacco: Never Used   Substance Use Topics    Alcohol use: No     Alcohol/week: 0.0 standard drinks     Family History:   Family History   Problem Relation Age of Onset    Hypertension Mother        PHYSICAL EXAM:      There were no vitals taken for this visit. I        Heart:  RRR, normal s1s2    Lungs:  CTA and normal effort    Abdomen:   Soft, nt nd. ASSESSMENT AND PLAN:    1. Patient is a 72 y.o. male here for endoscopy with MAC sedation. 2.  Procedure options, risks and benefits reviewed with patient and/or guardian. They express understanding.     Edna Garcia MD  [facility]

## 2021-02-03 NOTE — ANESTHESIA PRE PROCEDURE
Department of Anesthesiology  Preprocedure Note       Name:  Wilfredo Howard   Age:  72 y.o.  :  1955                                          MRN:  [id number]         Date:  2/3/2021      Surgeon: Dina Kelley):  Mikhail Boudreaux MD    Procedure: Procedure(s):  COLONOSCOPY    Medications prior to admission:   Prior to Admission medications    Medication Sig Start Date End Date Taking? Authorizing Provider   polyethylene glycol (GLYCOLAX) 17 GM/SCOOP powder Take 17 g by mouth daily as needed    Historical Provider, MD   calcium carbonate 600 MG TABS tablet Take 1 tablet by mouth 2 times daily    Historical Provider, MD   PARoxetine (PAXIL) 20 MG tablet Take 20 mg by mouth See Admin Instructions Take 3 in the morning and 1 in the evening. Historical Provider, MD   ALPRAZolam Tawanna Clapper) 1 MG tablet Take 1 tablet by mouth every 6 hours for 180 days. .  May take 1 additional tab as needed for anxiety 9/20/20 3/19/21  Balaji Chase,    aspirin 81 MG EC tablet Take 81 mg by mouth daily    Historical Provider, MD   finasteride (PROSCAR) 5 MG tablet Take 5 mg by mouth daily    Historical Provider, MD   melatonin 3 MG TABS tablet Take 6 mg by mouth nightly    Historical Provider, MD   pantoprazole (PROTONIX) 20 MG tablet Take 20 mg by mouth daily    Historical Provider, MD   pravastatin (PRAVACHOL) 40 MG tablet Take 40 mg by mouth nightly    Historical Provider, MD   QUEtiapine (SEROQUEL) 100 MG tablet Take 100 mg by mouth 2 times daily    Historical Provider, MD   acetaminophen (TYLENOL) 325 MG tablet Take 650 mg by mouth every 6 hours as needed for Pain    Historical Provider, MD   vitamin D (CHOLECALCIFEROL) 25 MCG (1000 UT) TABS tablet Take 1,000 Units by mouth daily    Historical Provider, MD   Multiple Vitamins-Minerals (CENTRUM SILVER) TABS Take by mouth daily    Historical Provider, MD       Current medications:    No current facility-administered medications for this encounter.         Allergies:  No Known Allergies    Problem List:    Patient Active Problem List   Diagnosis Code    Weakness R53.1    CIDP (chronic inflammatory demyelinating polyneuropathy) [facility] G61.81    Urinary retention R33.9    Generalized anxiety disorder F41.1    History of pulmonary embolism Z86.711    Mixed obsessional thoughts and acts F42.2    Dyslipidemia E78.5    Dementia with behavioral disturbance ([address] F03.91    Vitamin D deficiency E55.9    Microcytic anemia D50.9    Positive FIT (fecal immunochemical test) R19.5       Past Medical History:        Diagnosis Date    CIDP (chronic inflammatory demyelinating polyneuropathy) ([facility]     Clostridium difficile colitis 2017    Constipation     Deep vein thrombosis (DVT) of left lower extremity ([address] 08/2018    Dementia with behavioral disturbance ([address]     Depression with anxiety     Discitis of cervical region 08/2017    Dyslipidemia     E coli bacteremia 01/24/2020    AMARJIT (generalized anxiety disorder)     OCD (obsessive compulsive disorder)     Overactive bladder     Primary insomnia     Pulmonary embolism ([address] 08/2017    Pyelonephritis of right kidney 01/24/2020    with sepsis    Rhabdomyolysis 11/05/2016    Urinary retention 11/05/2016    Vitamin D deficiency        Past Surgical History:        Procedure Laterality Date    CERVICAL FUSION  08/2017    C3-T1 Laminectomy with fusion    HERNIA REPAIR      R side    OTHER SURGICAL HISTORY  11/10/2016    CYSTOSCOPY       Social History:    Social History     Tobacco Use    Smoking status: Never Smoker   Substance Use Topics    Alcohol use: No     Alcohol/week: 0.0 standard drinks                                Counseling given: Not Answered      Vital Signs (Current): There were no vitals filed for this visit.                                            BP Readings from Last 3 Encounters:   01/03/21 126/74   11/27/20 110/60   10/29/20 104/68       NPO Status: ROS comment: CIDP (chronic inflammatory demyelinating polyneuropathy) ([facility]) Abdominal:           Vascular:                                        Anesthesia Plan      MAC     ASA 3       Induction: intravenous. Anesthetic plan and risks discussed with patient.                       Anil Precidao MD   2/3/2021

## 2021-02-09 ENCOUNTER — OFFICE VISIT (OUTPATIENT)
Dept: SURGERY | Age: 66
End: 2021-02-09
Payer: MEDICARE

## 2021-02-09 ENCOUNTER — TELEPHONE (OUTPATIENT)
Dept: SURGERY | Age: 66
End: 2021-02-09

## 2021-02-09 ENCOUNTER — TELEPHONE (OUTPATIENT)
Dept: INTERNAL MEDICINE CLINIC | Age: 66
End: 2021-02-09

## 2021-02-09 ENCOUNTER — PREP FOR PROCEDURE (OUTPATIENT)
Dept: SURGERY | Age: 66
End: 2021-02-09

## 2021-02-09 VITALS
HEART RATE: 115 BPM | DIASTOLIC BLOOD PRESSURE: 95 MMHG | WEIGHT: 203 LBS | HEIGHT: 69 IN | SYSTOLIC BLOOD PRESSURE: 173 MMHG | TEMPERATURE: 97 F | BODY MASS INDEX: 30.07 KG/M2

## 2021-02-09 DIAGNOSIS — D12.3 ADENOMA OF TRANSVERSE COLON: Primary | ICD-10-CM

## 2021-02-09 DIAGNOSIS — F41.1 GENERALIZED ANXIETY DISORDER: ICD-10-CM

## 2021-02-09 PROCEDURE — 1036F TOBACCO NON-USER: CPT | Performed by: SURGERY

## 2021-02-09 PROCEDURE — G8484 FLU IMMUNIZE NO ADMIN: HCPCS | Performed by: SURGERY

## 2021-02-09 PROCEDURE — G8427 DOCREV CUR MEDS BY ELIG CLIN: HCPCS | Performed by: SURGERY

## 2021-02-09 PROCEDURE — 1123F ACP DISCUSS/DSCN MKR DOCD: CPT | Performed by: SURGERY

## 2021-02-09 PROCEDURE — 4040F PNEUMOC VAC/ADMIN/RCVD: CPT | Performed by: SURGERY

## 2021-02-09 PROCEDURE — G8417 CALC BMI ABV UP PARAM F/U: HCPCS | Performed by: SURGERY

## 2021-02-09 PROCEDURE — 3017F COLORECTAL CA SCREEN DOC REV: CPT | Performed by: SURGERY

## 2021-02-09 PROCEDURE — 99205 OFFICE O/P NEW HI 60 MIN: CPT | Performed by: SURGERY

## 2021-02-09 RX ORDER — ACETAMINOPHEN 325 MG/1
1000 TABLET ORAL ONCE
Status: CANCELLED | OUTPATIENT
Start: 2021-02-09 | End: 2021-02-09

## 2021-02-09 RX ORDER — SODIUM CHLORIDE 0.9 % (FLUSH) 0.9 %
10 SYRINGE (ML) INJECTION PRN
Status: CANCELLED | OUTPATIENT
Start: 2021-02-09

## 2021-02-09 RX ORDER — SODIUM CHLORIDE 0.9 % (FLUSH) 0.9 %
10 SYRINGE (ML) INJECTION EVERY 12 HOURS SCHEDULED
Status: CANCELLED | OUTPATIENT
Start: 2021-02-09

## 2021-02-09 NOTE — TELEPHONE ENCOUNTER
Pedro Hi is requesting a call back from Dr. Lexie Aguilar regarding patient's colonoscopy. This is all the information Pedro Hi would provided. Pedro Hi can be reached at the number provided.

## 2021-02-09 NOTE — TELEPHONE ENCOUNTER
Martin Burr, patient's brother, called in stating that the patient would like to schedule surgery for Monday 2/15/21    Please contact Martin Burr at [phone]

## 2021-02-09 NOTE — LETTER
P - [facility] [phone]                                                  p [phone]  f [phone]    Lulu Palmer MD                        SURGERY ORDER   -- Time of order -- 21    4:36 PM    Facility:   [facility]  # _________________                                                                                    Scheduled By:____________                  Surgery Date & Time:  2-15-21 @  7:15                                   Pt arrival: 5:30                                                                                      Patient Name:  Chani Holt     :  1955     PCP:  Phillip Rausch DO      Home Ph:    [phone] (home)                                                     PROCEDURE:  Laparoscopic vs open partial colectomy  [id number]    DIAGNOSIS:  Transverse colon adenoma    D12.3    Anesthesia: _General    Anesthesia (lines, blocks, TAP/epidural, etc): none  Time Needed:  2 hours    Pt Position:  supine    Ureteral Stents: no  Ostomy Marking: no          Admit _x__                  Pre-Op clearance to be done by: _PCP____    Cardiac Clearance Done by: ________    Medications to be stopped 7 days before surgery: __ASA_______                                                                                                                                                                                                         Lulu Palmer MD                          COLON SURGERY CHECKLIST    -- Pre-op clearance: PCP  -- Anticoagulation/lovenox, ASA, plavix, etc  -- Surgery order faxed, date/time obtained, placed on calendar  -- Prep and oral antibiotics (#2) ordered, information given to patient  -- Phone call day before procedure to confirm  -- Post op appt: 2 weeks  -- Other: wants surgery ASAP

## 2021-02-09 NOTE — PATIENT INSTRUCTIONS
or a laparoscopic surgery is converted to an open surgery at the same time as the laparoscopic operation. Risk factors for requiring conversion to an open surgery include scar tissue from previous abdominal surgeries, large hernias, bleeding, or large tumors. Both techniques require general anesthesia (completely asleep) for 2-6 hours (depending on the specifics of the operation being performed), and approximately 2 - 5 days on average spent recovering in the hospital. Several large studies have demonstrated that both approaches produce equivalent cancer outcomes when performed by surgeons with sufficient training in colorectal surgery. The surgeon removes the colon and the same amount of normal colon and lymph nodes with either approach. There are specific indications for choosing laparoscopy, robotic, or open surgery; your surgeon will discuss these features with you prior to the operation. When a section of your colon or rectum is removed, the surgeon can usually reconnect the healthy ends of your intestine. This is called an anastomosis. However, sometimes reconnection is not possible. In this case, the surgeon creates a new path for waste to leave your body. The surgeon makes an opening (stoma) in the wall of the abdomen, connects the intestine to the skin, and closes the other end. The operation to create the stoma is called a colostomy. A flat bag fits over the stoma to collect waste, and a special adhesive holds it in place. Less commonly, the small intestine may be used to create a stoma (an ileostomy). Ostomies can be temporary or permanent. For many people, a temporary stoma can be reversed 2-6 months later, depending on the situation. BEFORE SURGERY    Be sure to discuss all of your medications with your surgeon. If you take any blood thinners, a plan will need to be in place for stopping these at a certain time before surgery.   This will often be coordinated with your primary care provider or another specialist, such as a cardiologist, if needed. Be sure to be as active as possible in the weeks before surgery, and to maintain a healthy lifestyle and diet. You will receive information regarding the bowel prep from the office. Be sure to  your prescriptions and other bowel prep at least 2 to 3 days before your surgery so that you are prepared to take the prep the day before surgery. Follow the instructions on the bowel prep information. You should not be eating any solid food the day before surgery. Be sure to alert all of your physicians regarding the plan for surgery. You may need to obtain a preoperative history and physical or clearance from your primary care provider, or other specialists, such as cardiology or pulmonology. Be sure you know what time to arrive to the hospital, and arrange someone to drive you. Typically you will arrive at least 1.5 to 2 hours before the scheduled surgery time. During this time, you will receive an IV, and meet the other members of the surgical team, have your questions answered, and be registered for hospital admission. Your surgeon will be in to see you before surgery as well to ensure all of your questions are answered. RECOVERY AFTER SURGERY    On average, after laparoscopic and robotic surgery, patients are typically discharged in 2 to 4 days. Open surgery usually requires a longer hospital stay, average 3 to 6 days. During your hospital stay, you will be encouraged to be active, spend most of the day out of bed, and walk the halls (with assistance as needed). You will also be encouraged to use a breathing device called an incentive spirometer, which can help maintain lung expansion and prevent pneumonia. Most patients will have a catheter in the bladder for at least 1 day. The time it takes to heal after surgery is different for each person. You may be uncomfortable for the first few days.  Medicine can help control your pain; you should be comfortable enough to stand and walk with assistance. Before surgery, you should discuss the plan for pain relief with your doctor or nurse. After surgery, your doctor can adjust the plan if you need more pain relief. It is common to feel tired or weak for awhile. Surgery can also cause postoperative constipation or diarrhea. Your surgeon can provide instructions on the management of these conditions. In addition, your health care team monitors you for signs of bleeding, infection, or other problems requiring immediate treatment. Recovery after leaving the hospital can vary from person to person. You should resume light activity such as walking and personal care. Most people take about 2 weeks off from work after a colon operation, depending on the specifics of your job. You will be restricted on heavy lifting (over 10 pounds) for about 4-6 weeks after the operation to help lower the risk of abnormal healing, which can lead to hernias. Generally, you may resume a normal diet, and adequate fluid intake is especially important. Ask your surgeon or nurse prior to discharge about resuming your prior medications in addition to any new medicine you may be taking. At home, most patients can control pain adequately using Tylenol and ibuprofen (or Motrin/Advil). Ice packs and heating pads can also be used. You will also be given a prescription for a narcotic medication such as Percocet or oxycodone, which you should only use as needed. Narcotic medications can have severe side effects of nausea and constipation, and some patients may develop dependence. You should not drive, operate any machinery, drink alcohol, or make major decisions while taking narcotics. You will receive a phone call from the surgeon regarding your pathology results in cases of cancer or precancerous tissue. Pathology takes at least 4-5 business days before a final report is generated.     After discharge, please call the office to schedule a postoperative appointment in about 2 weeks. During this appointment, Dr. Idalmis Demarco will examine your wounds, and see how you are recovering from surgery. If you have any paperwork that needs to be filled out for work, please bring this to the office or fax this to the office. RISKS OF COLON SURGERY    Colon surgery is major surgery, and has risks. Even in healthy patients, complications can happen. Some of these risks can include (but are not limited to): bleeding, wound infections, deeper infections, hernias (especially with open surgery), scar tissue, missed lesions, unexpected findings, need to go back to the operating room or have another procedure, need for temporary or permanent stoma (see above), damage to other structures (such as intestine, blood vessels, other organs, and nerves), blood clots, pneumonia, heart attack, kidney failure, urinary infections, anastomotic leak, and even death. Anastomotic leak occurs if the bowel reconnection does not heal properly. Even in healthy patient with no medical problems, this can occur. Symptoms can include rectal bleeding, fevers, bloating, change in appetite, change in heart rate and generally not feeling well. Sometimes it can be difficult to diagnose a leak given the big overall with normal recovery and other problems that occur with surgery. Leaks occur most commonly within the first 1-2 weeks after surgery, but can be found much later as well. If a leak does happen, there is a wide range of possible effects, depending on the severity. With small/minor leaks, the leak may seal itself off, and some patients may not notice any changes. Some may have low grade fevers or change in appetite.  In some leaks, a fluid collection (abscess) may develop that can typically be treated with antibiotics and sometimes a drainage tube placed by a radiologist. Fluid collections can sometimes heal spontaneously, or sometimes develop into a long term fistula, or abnormal connection to the skin, or another organ. Occasionally these can require a second surgery if it does resolve over a reasonable amount of time (typically 3-6 months). Another long term problem with an imperfect healing anastomosis is a stricture, or over-tightening of the colon, that can cause difficulties eliminating stool. In patients with a substantial or large leak, this can be quite dangerous, and can cause sepsis. Emergency reoperation may be necessary to control the stool spillage in this situation. This last scenario is especially worrisome for need for a stoma, long term issues with bowel function, prolonged recovery, or even death. Many of these complications are increased in those with risk factors such as: current and previous smokers, poorly controlled diabetics, alcohol drinkers, patients with previous abdominal surgery, patients with history of chemotherapy or radiation, those who take steroids or blood thinners, patients with increased disposition to heart, lung, or kidney problems and those who are obese. You will be under general anesthesia (completely asleep) during the operation. There are risks of anesthesia, especially with longer operations, that will be discussed in greater detail with you by the anesthesiologist on the day of surgery. Some of these risks include changes in breathing or circulation, allergic reactions to medications, need to be on the ventilator after surgery. Please reach out to your surgeon if you have any questions about these risks and complications. WHAT IS A COLON AND RECTAL SURGEON? Colon and rectal surgeons are experts in the surgical and non-surgical treatment of diseases of the colon, rectum and anus. They have completed advanced surgical training in the treatment of these diseases as well as full general surgical training.  Board-certified colon and rectal surgeons complete residencies in general surgery and colon and rectal surgery, and pass intensive examinations conducted by the [address] [place] [address] Rd[address] of Surgery and the [employer] Board of Colon and Rectal Surgery. They are well-versed in the treatment of both benign and malignant diseases of the colon, rectum, anus, and small intestine, and are able to perform routine screening examinations and surgically treat conditions if indicated to do so. If you have any questions before your surgery, please call Dr Dollie Hodgkins office at [phone].

## 2021-02-09 NOTE — PROGRESS NOTES
[address] [address] [address]  Dept: [phone]  Dept Fax: [phone]  Loc: [phone]    Visit Date: 2/9/2021    Emily Collazo is a 72 y.o. male who presents today for: New Patient (transverse colon referrred by Dr Clare Alaniz)      HPI:       Emily Collazo is a 72 y.o. male referred to me by Dr. Clare Alaniz of GI for further evaluation regarding transverse colon large adenomatous polyp. Lennie Martinez is here in the office accompanied by his brother. He has a history of severe OCD and psychiatric problems and lives at a facility due to this. However, he is able to answer questions appropriately and does seem to have a good grasp on what is happening with his health. Lennie Martinez recently had a positive fit test, which was followed by colonoscopy. He was found to have a handful of small polyps throughout the colon, but did have a large 5 cm broad-based polypoid lesion in the transverse colon the base of this was determined to be broad and extensive biopsies were obtained, and the lesion was tattooed. Biopsies showed adenomatous tissue. Lennie Martinez has a history of inguinal hernia repair but no other abdominal surgeries. His brother denies family history of colon cancer. They are unsure if Lennie Martinez has had a previous colonoscopy before this 1. Denies smoking. Patient's problem list, medications, past medical, surgical, family, and social histories were reviewed and updated in the chart as indicated today.     Past Medical History:   Diagnosis Date    Adenomatous colon polyp 02/03/2021    Multiple    CIDP (chronic inflammatory demyelinating polyneuropathy) (HCC)     Clostridium difficile colitis 2017    Constipation     Deep vein thrombosis (DVT) of left lower extremity ([address] 08/2018    Dementia with behavioral disturbance (HCC)     Depression with anxiety     Discitis of cervical region 08/2017    Dyslipidemia     E coli bacteremia 01/24/2020    AMARJIT (generalized anxiety disorder)     OCD (obsessive compulsive disorder)     Overactive bladder     Primary insomnia     Pulmonary embolism [address] [address] 08/2017    Pyelonephritis of right kidney 01/24/2020    with sepsis    Rhabdomyolysis 11/05/2016    Urinary retention 11/05/2016    Vitamin D deficiency        Past Surgical History:   Procedure Laterality Date    CERVICAL FUSION  08/2017    C3-T1 Laminectomy with fusion    COLONOSCOPY N/A 2/3/2021    COLONOSCOPY POLYPECTOMY SNARE/COLD BIOPSY performed by Leonor Mcgee MD at [address] [place] [address]    OTHER SURGICAL HISTORY  11/10/2016    CYSTOSCOPY       Cancer-related family history is not on file. Social History:   Social History     Tobacco Use    Smoking status: Never Smoker    Smokeless tobacco: Never Used   Substance Use Topics    Alcohol use: No     Alcohol/week: 0.0 standard drinks      Tobacco cessation counseling provided as appropriate. REVIEW OF SYSTEMS:    Pertinent positives and negatives are mentioned in the HPI above. Otherwise, all other systems were reviewed and negative. Objective:     Physical Exam   BP (!) 173/95 (Site: Right Upper Arm, Position: Sitting, Cuff Size: Large Adult)   Pulse 115   Temp 97 °F (36.1 °C)   Ht 5' 9\" (1.753 m)   Wt 203 lb (92.1 kg)   BMI 29.98 kg/m²   Constitutional: Appears well-developed and well-nourished. Grooming appropriate. No gross deformities. Body mass index is 29.98 kg/m². Eyes: No scleral icterus. Conjunctiva/lids normal. Vision intact grossly. Pupils equal/symmetric, reactive bilaterally. ENT: External ears/nose without defect, scars, or masses. Hearing grossly intact. No facial deformity. Lips normal, normal dentition. Neck: No masses. Trachea midline. No crepitus. Thyroid not enlarged. Cardiovascular: Normal rate. No peripheral edema. Abdominal aorta normal size to palpation.   Pulmonary/Chest: Effort normal. No respiratory distress. No wheezes. No use of accessory muscles. Musculoskeletal: Normal range of motion x all 4 extremities and head/neck, without deformity, pain, or crepitus, with normal strength and tone. Normal gait. Nails without clubbing or cyanosis. Neurological: Alert and oriented to person, place, and time. No gross deficits. Sensation intact. Skin: Skin is dry. No rashes noted. No pallor. No induration of nodules. Psychiatric: Normal mood and affect. Behavior normal. Oriented to person, place, and time. Judgment and insight reasonable. Abdominal/wound: soft, nontender, nondistended    Labs reviewed: none  Radiology reviewed: none    Last colonoscopy: [facility]ngel    Discussion and coordination of care with GI. Review of colonoscopy report, including images. Review of pathology report. Assessment/Plan:     A/P:  New problem(s): Endoscopically unresectable polypoid mass of the transverse colon  Established problem(s): OCD, anxiety  Additional workup/treatment planned: Laparoscopic partial colectomy  Risk of complications/morbidity: High    I had a very long discussion with Anne-Marie Farheen and his brother today in the office. We discussed the pathophysiology, etiology, work-up, natural history, treatment options including surgical treatment for endoscopically unresectable polyps of the colon. We reviewed the images obtained by Dr. Cinthia Yancey, as well as the pathology. We discussed that polyps of this size are not amenable to endoscopic therapy, which does require surgical extirpation. I offered laparoscopic versus open partial colectomy. I discussed the need for either partial transverse versus extended right versus extended left colectomy depending on the exact location of the area. Discussed plan to treat this with proper oncologic technique, in the case that final pathology may reveal malignancy. Discussed high likelihood for anastomosis.     His brother did inquire into whether this is something that typically requires a second opinion. I did reiterate that I am very comfortable with the diagnosis and treatment plan, but he is of course welcome to seek out a second opinion, as I want him to feel confident in the plan. Offered to send him to my partner, Dr. Louise Ramires, as he would likely be able to get Lina Colon in fairly quickly, as soon as tomorrow. If he wants to seek an opinion outside of the [facility] system, he would have to arrange this on his own. I had a discussion with the patient and brother regarding the risks, benefits, and alternatives of the procedure, including, but not limited to: bleeding, infection, anastomotic leak, poor wound healing or cosmetic result, hernia, bowel obstruction, fistula, need for reoperation or additional procedures, temporary or permanent ostomy, damage to other structures, such as bowel, bladder, ureter, stomach, liver, and neurovascular structures. Need for, and risks of general anesthesia discussed. Postoperative typical bowel function, urinary function, and sexual function were discussed. The likelihood of open operation was discussed as well. Prep, preoperative testing, typical hospital stay, and perioperative expectations were addressed, as well as typical expectations regarding pain control and time away from work and daily activities. All questions were answered to patient's satisfaction. They understand that even in technically successfully operations and even in healthy patients, complications can occur. I provided and encouraged patients and family members to review AVS (after visit summary) information that I have provided, that contains even more information regarding surgical risks and complications. They were encouraged to reach out to my office if they have any questions before or after surgery. Patient understands higher risk of perioperative morbidity and mortality given: psych history     Patient and brother would like to proceed with surgery ASAP.   Though this is not unreasonable, I did discuss that this is typically a very slow-growing process and not an emergency. Regardless, we will try to accommodate and get him on the schedule for surgery in the next 1 to 2 weeks. Continue with current medications    I provided written information in the After Visit Summary AVS Regarding: colon surgery    DISPOSITION:  Plan surgery in the next 1-2 wks    My findings will be relayed to consulting practitioner or PCP via Epic    Total encounter time:  60 min, including any number of the following: review of labs, imaging, provider notes, outside hospital records; performing examination/evaluation; counseling patient and family; ordering medications/tests; placing referrals and communication with referring physicians; coordination of care, and documentation in the EHR. Note completed using dictation software, please excuse any errors.     Electronically signed by Noah Jones MD on 2/9/2021 at 3:24 PM

## 2021-02-10 ENCOUNTER — TELEPHONE (OUTPATIENT)
Dept: SURGERY | Age: 66
End: 2021-02-10

## 2021-02-12 ENCOUNTER — TELEPHONE (OUTPATIENT)
Dept: SURGERY | Age: 66
End: 2021-02-12

## 2021-02-12 ENCOUNTER — ANESTHESIA EVENT (OUTPATIENT)
Dept: OPERATING ROOM | Age: 66
DRG: 331 | End: 2021-02-12
Payer: MEDICARE

## 2021-02-12 NOTE — TELEPHONE ENCOUNTER
Spoke with patient's brother to confirm time change for surgery at [facility]. Also left message for Christine at [facility].

## 2021-02-13 ENCOUNTER — OUTSIDE SERVICES (OUTPATIENT)
Dept: INTERNAL MEDICINE CLINIC | Age: 66
End: 2021-02-13
Payer: MEDICARE

## 2021-02-13 VITALS
TEMPERATURE: 98.2 F | HEART RATE: 69 BPM | BODY MASS INDEX: 28.73 KG/M2 | HEIGHT: 69 IN | RESPIRATION RATE: 18 BRPM | WEIGHT: 194 LBS | OXYGEN SATURATION: 93 % | SYSTOLIC BLOOD PRESSURE: 128 MMHG | DIASTOLIC BLOOD PRESSURE: 74 MMHG

## 2021-02-13 DIAGNOSIS — K21.9 GASTROESOPHAGEAL REFLUX DISEASE WITHOUT ESOPHAGITIS: ICD-10-CM

## 2021-02-13 DIAGNOSIS — F41.1 GENERALIZED ANXIETY DISORDER: ICD-10-CM

## 2021-02-13 DIAGNOSIS — K63.89 COLONIC MASS: ICD-10-CM

## 2021-02-13 DIAGNOSIS — G61.81 CIDP (CHRONIC INFLAMMATORY DEMYELINATING POLYNEUROPATHY) (HCC): ICD-10-CM

## 2021-02-13 DIAGNOSIS — E78.5 HYPERLIPIDEMIA, UNSPECIFIED HYPERLIPIDEMIA TYPE: ICD-10-CM

## 2021-02-13 DIAGNOSIS — Z86.711 HISTORY OF PULMONARY EMBOLISM: ICD-10-CM

## 2021-02-13 DIAGNOSIS — N13.8 BPH WITH OBSTRUCTION/LOWER URINARY TRACT SYMPTOMS: ICD-10-CM

## 2021-02-13 DIAGNOSIS — N18.31 STAGE 3A CHRONIC KIDNEY DISEASE (HCC): ICD-10-CM

## 2021-02-13 DIAGNOSIS — N40.1 BPH WITH OBSTRUCTION/LOWER URINARY TRACT SYMPTOMS: ICD-10-CM

## 2021-02-13 DIAGNOSIS — Z86.718 HISTORY OF DEEP VENOUS THROMBOSIS: ICD-10-CM

## 2021-02-13 DIAGNOSIS — F03.91 DEMENTIA WITH BEHAVIORAL DISTURBANCE, UNSPECIFIED DEMENTIA TYPE: ICD-10-CM

## 2021-02-13 DIAGNOSIS — Z01.818 PREOP EXAM FOR INTERNAL MEDICINE: Primary | ICD-10-CM

## 2021-02-13 DIAGNOSIS — F42.2 MIXED OBSESSIONAL THOUGHTS AND ACTS: ICD-10-CM

## 2021-02-13 DIAGNOSIS — Z86.010 HX OF ADENOMATOUS COLONIC POLYPS: ICD-10-CM

## 2021-02-13 PROBLEM — Z78.9 NURSING HOME RESIDENT: Status: ACTIVE | Noted: 2021-02-13

## 2021-02-13 PROCEDURE — 99309 SBSQ NF CARE MODERATE MDM 30: CPT | Performed by: INTERNAL MEDICINE

## 2021-02-13 NOTE — PROGRESS NOTES
[facility]) Physicians  Internal Medicine  Patient Encounter  Leopoldo Righter, D.O., [place]          Chief Complaint   Patient presents with    Pre-op Exam     Large transverse adenomatous colon polyp. HPI-- 72 y.o. male who resides at [facility] in long term care who is seen today for a preoperative physical.  Pt diagnosed with a large adenomatous colon polyp in the transverse colon found during colonoscopy. Pt was found to have a slightly worsening anemia on routine lab. Subsequent fecal immunochemical test was positive. Kimberly Parish has had no problems with hematochezia or melena. He has had no problems with his appettie or unexplained weight loss. He was referred to GI for colonoscopy. This was performed on 2/3/2021 by Dr. Vipin Burrows. Several polyps were found. One polyp located in the transverse colon was too large to remove endoscopically. This polypoid mass measured 5 cm. All polyps were adenomatous without dysplasia. Dr. Vipin Burrows referred pt to colorectal specialist, Dr. Idalmis Demarco. Pt is now scheduled for a laparoscopic partial colectomy. I have been asked to see patient for pre-operative risk assessment and clearance. Surgery scheduled for 2/15/2021 by Dr. Idalmis Demarco. Patient Care Team:  Leopoldo Righter, DO as PCP - General (Internal Medicine)  Leopoldo Righter, DO as PCP - [facility]  Bren Hooper MD as Consulting Physician (Gastroenterology)  Ken Urias MD as Consulting Physician (Colon and Rectal Surgery)  Edwardo Ji MD as Consulting Physician (Psychiatry)         Anemia--patient has had a mild anemia with some microcytosis along with a positive FIT as noted above. Pt found to have several adenomatous colon polyps. One was a 5 cm polypoid mass, too large to resect endoscopically. There has been no report of hematochezia or melena.    Hgb 8/2020---->14.5, Hgb 9/2020---->12.9, Hgb 10/2020---->11.7, Hgb 2/2021---->13.1    Depression/anxiety/OCD/Dementia--Pt has a long history of severe psychiatric disease. Patient is currently under the care of Dr. Marlen Jain. He remains on Xanax and Paxil as well as Seroquel. 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 Additionally the patient has diagnoses of obsessive-compulsive disorder. Malina Williamson displays behaviors of vocalizing repetitive thoughts and behaviors. He fixates on a certain subject or thought. He can have periods of outbursts, irritability, excessive worry.       CIDP--Condition is stable. This disease has rendered Wayne with BL LE weakness. He is able to ambulate. Pt denies any falls. He used to receive IVIg, but he does not receive this any longer.        BPH/lower urinary tract obstruction--Patient has a history of urinary retention. He has not had problems with retention in several years. He remains on Finasteride. CKD-- Stage 3. No report of foamy or frothy urine. NO swelling.      GERD--Patient denies any heartburn or dysphagia. He is on Protonix 20 mg daily.       Hyperlipidemia--patient remains on pravastatin without adverse side effects. He denies any new myalgias.     Chronic constipation--patient is doing well with the MiraLAX. He takes 17 g daily. Denies any abdominal pain, nausea, bloating, melena. He denies any bright red blood with BM's.         EKG obtained on 2/10/2021 at [facility]. This was interpreted by a cardiologist as atrial fibrillation. I reviewed the EKG. This EKG clearly shows a regular sinus mechanism with visible p waves. No A. Fib.      LAB 2/10/2021:    Na 135  K 4.1  Cl 104  BUN 27  Cr 1.4  GFR--51    WBC  7.6  Hgb 13.1  Hct 39.3  Plt 231,000    COVID-19 RNA test  2/10/21---> Negative    Medical/Surgical Histories     Past Medical History:   Diagnosis Date    Adenomatous colon polyp 02/03/2021    Multiple    CIDP (chronic inflammatory demyelinating polyneuropathy) (HCC)     Clostridium difficile colitis 2017    Constipation     Deep vein thrombosis (DVT) of left lower extremity ([facility] [address] 08/2018    Dementia with behavioral disturbance ([facility] [address] [facility]     Depression with anxiety     Difficulty walking     Discitis of cervical region 08/2017    Dyslipidemia     E coli bacteremia 01/24/2020    AMARJIT (generalized anxiety disorder)     Hyperlipidemia     OCD (obsessive compulsive disorder)     Overactive bladder     Primary insomnia     Pulmonary embolism ([facility] [address] 08/2017    Pyelonephritis of right kidney 01/24/2020    with sepsis    Rhabdomyolysis 11/05/2016    Stage 3a chronic kidney disease     Urinary retention 11/05/2016    Vitamin D deficiency           Past Surgical History:   Procedure Laterality Date    CERVICAL FUSION  08/2017    C3-T1 Laminectomy with fusion    COLONOSCOPY N/A 2/3/2021    COLONOSCOPY POLYPECTOMY SNARE/COLD BIOPSY performed by Luis Felipe Wesley MD at [facility]      R side    OTHER SURGICAL HISTORY  11/10/2016    CYSTOSCOPY       No reported problems with general anesthesia      Medications/Allergies     Medication Sig    polyethylene glycol (GLYCOLAX) 17 GM/SCOOP powder Take 17 g by mouth daily as needed    calcium carbonate 600 MG TABS tablet Take 1 tablet by mouth 2 times daily    PARoxetine (PAXIL) 20 MG tablet Take 20 mg by mouth See Admin Instructions Take 3 (60 mg) in the morning and 1 (20 mg) in the evening.  ALPRAZolam (XANAX) 1 MG tablet Take 1 tablet by mouth every 6 hours for 180 days.  .  May take 1 additional tab as needed for anxiety    finasteride (PROSCAR) 5 MG tablet Take 5 mg by mouth daily    melatonin 3 MG TABS tablet Take 6 mg by mouth nightly    pantoprazole (PROTONIX) 20 MG tablet Take 20 mg by mouth daily    pravastatin (PRAVACHOL) 40 MG tablet Take 40 mg by mouth nightly    QUEtiapine (SEROQUEL) 100 MG tablet Take 100 mg by mouth 2 times daily    acetaminophen (TYLENOL) 325 MG tablet Take 650 mg by mouth every 6 hours as needed for Pain    vitamin D (CHOLECALCIFEROL) 25 MCG (1000 UT) TABS tablet Take 1,000 Units by mouth daily    Multiple Vitamins-Minerals (CENTRUM SILVER) TABS Take by mouth daily         No Known Allergies      Substance Use History     Social History     Tobacco Use    Smoking status: Never Smoker    Smokeless tobacco: Never Used   Substance Use Topics    Alcohol use: No     Alcohol/week: 0.0 standard drinks    Drug use: No          Family History     Family History   Problem Relation Age of Onset    Hypertension Mother         No family history of problems with general anesthesia, venous thrombosis, or bleeding dyscrasias. REVIEW OF SYSTEMS:    CONSTITUTIONAL:  Neg   Recent weight changes, fatigue, fever, chills or night sweats, anorexia, Sleep difficulties  EYES: Neg  Blurry vision, loss of vision, double vision, tearing, itching, eye pain. EARS:  Neg Hearing loss, tinnitus, vertigo, discharge or otalgia. NOSE:  Neg  Rhinorrhea, sneezing, itching, allergy, epistaxis, or snoring  MOUTH/THROAT:  Neg Bleeding gums, hoarseness, sore throat, dysphagia, throat infections, or dentures  RESPIRATORY:  Neg SOB ,wheeze, cough, sputum, hemoptysis. No report of + TB test.    CARDIOVASCULAR:  Neg Chest pain, palpitations, heart murmur, dyspnea on exertion, orthopnea, paroxysmal nocturnal dyspnea or edema of extremities, or claudication  GASTROINTESTINAL:  Neg   Nausea, vomiting, or diarrhea,  hematemesis, heart burn, dysphagia,change in bowel movements or stool caliber, hematochezia, melena, abdominal pain, or food intolerance. + Constipation. Colonoscopy: Yes, 2/3/2021  GENITOURINARY:  Neg  hesitancy, urgency, polyuria, dysuria, hematuria, nocturia, incontinence, change in stream, genital pain or swelling, kidney stones. + Frequency, + H/O BPH, + H/O Retention.  Used to require a catheter several years ago. HEMATOLOGIC/LYMPHATIC:  Neg bleeding dyscrasias, easy bruising, transfusions, or enlarged lymph nodes.  + H/O DVT 2018, H/O PE 2017.  + H/O Anemia  MUSCULOSKELETAL:  Neg  New myalgias, bone pain, joint pain, joint swelling, low back pain, neck pain, radicular pain, or fractures. + Cervical discitis 2017, osteomyelitis, fusion. NEUROLOGICAL:  + BL LE weakness>>UE. Forgetful. PSYCHIATRIC:  + Mood lability, +depression, + Anxiety, H/O psych counseling: Yes, Psychotropics: Yes  SKIN :  Neg  Rash, nail changes, sun burns, tattoos, change in moles, or skin color changes  ENDOCRINE:  Neg  Polydipsia,polyuria,abnormal weight changes,heat /cold intolerance, Hair changes. No H/O Diabetes or Thyroid disease. Physical Exam    Vitals:    02/13/21 1031   BP: 128/74   Pulse: 69   Resp: 18   Temp: 98.2 °F (36.8 °C)   SpO2: 93%   Weight: 194 lb (88 kg)   Height: 5' 9\" (1.753 m)     Body mass index is 28.65 kg/m². Wt Readings from Last 3 Encounters:   02/13/21 194 lb (88 kg)   02/09/21 203 lb (92.1 kg)   01/03/21 197 lb (89.4 kg)     BP Readings from Last 3 Encounters:   02/13/21 128/74   02/09/21 (!) 173/95   02/03/21 (!) 97/55      GEN:  72 y.o. male who is in NAD, A&O. He appears stated age and well nourished. He is cooperative and pleasant. He answers questions coherently. HEAD:  NC/AT, no lesions. EYES:  SUELLEN, EOMI, No scleral icterus or conjunctival injection or discharge. Visual fields in tact to confrontation. EARS:  EAC's clear, TM's normal.  MOUTH & THROAT:  Oral cavity is clear without mucosal lesions. Tongue is midline. Dentition is in good repair. No pharyngeal erythema or exudate. NECK:  Supple. Full ROM. Trachea is midline. No increased JVD. No thyromegaly or nodules. No masses  LYMPH: No C/SC/A/F nodes  CARDIAC:  Regular rhythm, rate NL, soft systolic murmur, No ectopy. VASC:  Pedal pulses 2/4. Carotid upstrokes 2+. No carotid bruits noted.     PULM: Lungs are CTA. Symmetric breath sounds noted. AP Diameter NL. GI:  Abdomen is soft and nontender. No distension. No organomegaly. No masses. No pulsatile masses. EXT:  No Cyanosis or clubbing. No edema. SKIN: Warm and dry, normal turgor, no rash or lesions of concern. NEURO:  Cranial nerves 2-12 are NL. NO lateralizing deficits.  + BL LE weakness and muscular atrophy. Slapping gait. MS:  No joint effusions or synovitis  PSYCH:  Mood is depressed, affect blunted. Slightly guarded on approach          Encounter Diagnoses   Name Primary?  Preop exam for internal medicine Yes    Colonic mass     Dementia with behavioral disturbance, unspecified dementia type ([facility])     CIDP (chronic inflammatory demyelinating polyneuropathy) ([facility])     Mixed obsessional thoughts and acts     Generalized anxiety disorder     Hx of adenomatous colonic polyps     History of deep venous thrombosis     History of pulmonary embolism     Hyperlipidemia, unspecified hyperlipidemia type     Gastroesophageal reflux disease without esophagitis     Nursing home resident     BPH with obstruction/lower urinary tract symptoms     Stage 3a chronic kidney disease        Plan:  Acceptable risk for the planned procedure. No contraindications at this time    EKG-- Reviewed. Normal sinus rhythm. No arrhythmia. No ischemic changes. No contraindications for surgery    Lab drawn--- reviewed. No contraindications for surgery    Pt will avoid NSAID's, OTC vitamin supplements and fish oil 1 week prior to procedure      DVT prophylaxis-- Per surgical protocol.             Electronically Signed:  Jeanne Carr D.O      Copy of H&P given to pt and sent to Sx

## 2021-02-15 ENCOUNTER — ANESTHESIA (OUTPATIENT)
Dept: OPERATING ROOM | Age: 66
DRG: 331 | End: 2021-02-15
Payer: MEDICARE

## 2021-02-15 ENCOUNTER — HOSPITAL ENCOUNTER (INPATIENT)
Age: 66
LOS: 1 days | Discharge: HOME | DRG: 331 | End: 2021-02-16
Attending: SURGERY | Admitting: SURGERY
Payer: MEDICARE

## 2021-02-15 VITALS — TEMPERATURE: 98.6 F | OXYGEN SATURATION: 100 % | SYSTOLIC BLOOD PRESSURE: 127 MMHG | DIASTOLIC BLOOD PRESSURE: 74 MMHG

## 2021-02-15 DIAGNOSIS — D12.3 ADENOMA OF TRANSVERSE COLON: ICD-10-CM

## 2021-02-15 DIAGNOSIS — G89.18 POST-OP PAIN: Primary | ICD-10-CM

## 2021-02-15 LAB
ABO/RH: NORMAL
ANION GAP SERPL CALCULATED.3IONS-SCNC: 7 MMOL/L (ref 3–16)
ANTIBODY SCREEN: NORMAL
BUN BLDV-MCNC: 15 MG/DL (ref 7–20)
CALCIUM SERPL-MCNC: 8.5 MG/DL (ref 8.3–10.6)
CHLORIDE BLD-SCNC: 106 MMOL/L (ref 99–110)
CO2: 28 MMOL/L (ref 21–32)
CREAT SERPL-MCNC: 1.1 MG/DL (ref 0.8–1.3)
GFR AFRICAN AMERICAN: >60
GFR NON-AFRICAN AMERICAN: >60
GLUCOSE BLD-MCNC: 107 MG/DL (ref 70–99)
GLUCOSE BLD-MCNC: 97 MG/DL (ref 70–99)
HCT VFR BLD CALC: 39.9 % (ref 40.5–52.5)
HEMOGLOBIN: 13.3 G/DL (ref 13.5–17.5)
MCH RBC QN AUTO: 31.2 PG (ref 26–34)
MCHC RBC AUTO-ENTMCNC: 33.4 G/DL (ref 31–36)
MCV RBC AUTO: 93.4 FL (ref 80–100)
PDW BLD-RTO: 13.3 % (ref 12.4–15.4)
PERFORMED ON: NORMAL
PLATELET # BLD: 210 K/UL (ref 135–450)
PMV BLD AUTO: 9.3 FL (ref 5–10.5)
POTASSIUM REFLEX MAGNESIUM: 4.1 MMOL/L (ref 3.5–5.1)
RBC # BLD: 4.27 M/UL (ref 4.2–5.9)
SODIUM BLD-SCNC: 141 MMOL/L (ref 136–145)
WBC # BLD: 11.2 K/UL (ref 4–11)

## 2021-02-15 PROCEDURE — 2500000003 HC RX 250 WO HCPCS: Performed by: SURGERY

## 2021-02-15 PROCEDURE — 0DTL4ZZ RESECTION OF TRANSVERSE COLON, PERCUTANEOUS ENDOSCOPIC APPROACH: ICD-10-PCS | Performed by: SURGERY

## 2021-02-15 PROCEDURE — 3700000000 HC ANESTHESIA ATTENDED CARE: Performed by: SURGERY

## 2021-02-15 PROCEDURE — 6370000000 HC RX 637 (ALT 250 FOR IP): Performed by: STUDENT IN AN ORGANIZED HEALTH CARE EDUCATION/TRAINING PROGRAM

## 2021-02-15 PROCEDURE — 7100000000 HC PACU RECOVERY - FIRST 15 MIN: Performed by: SURGERY

## 2021-02-15 PROCEDURE — 2580000003 HC RX 258: Performed by: SURGERY

## 2021-02-15 PROCEDURE — 2709999900 HC NON-CHARGEABLE SUPPLY: Performed by: SURGERY

## 2021-02-15 PROCEDURE — 0DTF4ZZ RESECTION OF RIGHT LARGE INTESTINE, PERCUTANEOUS ENDOSCOPIC APPROACH: ICD-10-PCS | Performed by: SURGERY

## 2021-02-15 PROCEDURE — 85027 COMPLETE CBC AUTOMATED: CPT

## 2021-02-15 PROCEDURE — 2580000003 HC RX 258: Performed by: STUDENT IN AN ORGANIZED HEALTH CARE EDUCATION/TRAINING PROGRAM

## 2021-02-15 PROCEDURE — 3600000004 HC SURGERY LEVEL 4 BASE: Performed by: SURGERY

## 2021-02-15 PROCEDURE — 88307 TISSUE EXAM BY PATHOLOGIST: CPT

## 2021-02-15 PROCEDURE — C9290 INJ, BUPIVACAINE LIPOSOME: HCPCS | Performed by: NURSE ANESTHETIST, CERTIFIED REGISTERED

## 2021-02-15 PROCEDURE — 64488 TAP BLOCK BI INJECTION: CPT | Performed by: ANESTHESIOLOGY

## 2021-02-15 PROCEDURE — 6360000002 HC RX W HCPCS: Performed by: NURSE ANESTHETIST, CERTIFIED REGISTERED

## 2021-02-15 PROCEDURE — 86900 BLOOD TYPING SEROLOGIC ABO: CPT

## 2021-02-15 PROCEDURE — 6360000002 HC RX W HCPCS: Performed by: SURGERY

## 2021-02-15 PROCEDURE — 44204 LAPARO PARTIAL COLECTOMY: CPT | Performed by: SURGERY

## 2021-02-15 PROCEDURE — 7100000001 HC PACU RECOVERY - ADDTL 15 MIN: Performed by: SURGERY

## 2021-02-15 PROCEDURE — 2500000003 HC RX 250 WO HCPCS: Performed by: NURSE ANESTHETIST, CERTIFIED REGISTERED

## 2021-02-15 PROCEDURE — 1200000000 HC SEMI PRIVATE

## 2021-02-15 PROCEDURE — 2720000010 HC SURG SUPPLY STERILE: Performed by: SURGERY

## 2021-02-15 PROCEDURE — 3700000001 HC ADD 15 MINUTES (ANESTHESIA): Performed by: SURGERY

## 2021-02-15 PROCEDURE — 2580000003 HC RX 258: Performed by: NURSE ANESTHETIST, CERTIFIED REGISTERED

## 2021-02-15 PROCEDURE — 6370000000 HC RX 637 (ALT 250 FOR IP): Performed by: SURGERY

## 2021-02-15 PROCEDURE — 3600000014 HC SURGERY LEVEL 4 ADDTL 15MIN: Performed by: SURGERY

## 2021-02-15 PROCEDURE — 86850 RBC ANTIBODY SCREEN: CPT

## 2021-02-15 PROCEDURE — 2580000003 HC RX 258: Performed by: ANESTHESIOLOGY

## 2021-02-15 PROCEDURE — 86901 BLOOD TYPING SEROLOGIC RH(D): CPT

## 2021-02-15 PROCEDURE — 80048 BASIC METABOLIC PNL TOTAL CA: CPT

## 2021-02-15 RX ORDER — PROPOFOL 10 MG/ML
INJECTION, EMULSION INTRAVENOUS PRN
Status: DISCONTINUED | OUTPATIENT
Start: 2021-02-15 | End: 2021-02-15 | Stop reason: SDUPTHER

## 2021-02-15 RX ORDER — LOPERAMIDE HYDROCHLORIDE 2 MG/1
2 CAPSULE ORAL 4 TIMES DAILY PRN
COMMUNITY

## 2021-02-15 RX ORDER — FENTANYL CITRATE 50 UG/ML
INJECTION, SOLUTION INTRAMUSCULAR; INTRAVENOUS PRN
Status: DISCONTINUED | OUTPATIENT
Start: 2021-02-15 | End: 2021-02-15 | Stop reason: SDUPTHER

## 2021-02-15 RX ORDER — PAROXETINE HYDROCHLORIDE 20 MG/1
20 TABLET, FILM COATED ORAL SEE ADMIN INSTRUCTIONS
Status: DISCONTINUED | OUTPATIENT
Start: 2021-02-15 | End: 2021-02-15

## 2021-02-15 RX ORDER — PAROXETINE HYDROCHLORIDE 20 MG/1
60 TABLET, FILM COATED ORAL EVERY MORNING
COMMUNITY

## 2021-02-15 RX ORDER — SODIUM CHLORIDE 0.9 % (FLUSH) 0.9 %
10 SYRINGE (ML) INJECTION EVERY 12 HOURS SCHEDULED
Status: DISCONTINUED | OUTPATIENT
Start: 2021-02-15 | End: 2021-02-15 | Stop reason: HOSPADM

## 2021-02-15 RX ORDER — PAROXETINE HYDROCHLORIDE 20 MG/1
20 TABLET, FILM COATED ORAL NIGHTLY
Status: DISCONTINUED | OUTPATIENT
Start: 2021-02-15 | End: 2021-02-16 | Stop reason: HOSPADM

## 2021-02-15 RX ORDER — ALPRAZOLAM 0.5 MG/1
1 TABLET ORAL EVERY 6 HOURS PRN
Status: DISCONTINUED | OUTPATIENT
Start: 2021-02-15 | End: 2021-02-16 | Stop reason: HOSPADM

## 2021-02-15 RX ORDER — ONDANSETRON 4 MG/1
4 TABLET, ORALLY DISINTEGRATING ORAL EVERY 8 HOURS PRN
Status: DISCONTINUED | OUTPATIENT
Start: 2021-02-15 | End: 2021-02-16 | Stop reason: HOSPADM

## 2021-02-15 RX ORDER — SODIUM CHLORIDE, SODIUM LACTATE, POTASSIUM CHLORIDE, CALCIUM CHLORIDE 600; 310; 30; 20 MG/100ML; MG/100ML; MG/100ML; MG/100ML
INJECTION, SOLUTION INTRAVENOUS CONTINUOUS PRN
Status: DISCONTINUED | OUTPATIENT
Start: 2021-02-15 | End: 2021-02-15 | Stop reason: SDUPTHER

## 2021-02-15 RX ORDER — PAROXETINE HYDROCHLORIDE 20 MG/1
60 TABLET, FILM COATED ORAL EVERY MORNING
Status: DISCONTINUED | OUTPATIENT
Start: 2021-02-16 | End: 2021-02-16 | Stop reason: HOSPADM

## 2021-02-15 RX ORDER — ROCURONIUM BROMIDE 10 MG/ML
INJECTION, SOLUTION INTRAVENOUS PRN
Status: DISCONTINUED | OUTPATIENT
Start: 2021-02-15 | End: 2021-02-15 | Stop reason: SDUPTHER

## 2021-02-15 RX ORDER — IBUPROFEN 200 MG
400 TABLET ORAL EVERY 6 HOURS
Status: DISCONTINUED | OUTPATIENT
Start: 2021-02-15 | End: 2021-02-15

## 2021-02-15 RX ORDER — SODIUM CHLORIDE 0.9 % (FLUSH) 0.9 %
10 SYRINGE (ML) INJECTION PRN
Status: DISCONTINUED | OUTPATIENT
Start: 2021-02-15 | End: 2021-02-16 | Stop reason: HOSPADM

## 2021-02-15 RX ORDER — PAROXETINE HYDROCHLORIDE 20 MG/1
20 TABLET, FILM COATED ORAL NIGHTLY
COMMUNITY

## 2021-02-15 RX ORDER — HYDRALAZINE HYDROCHLORIDE 20 MG/ML
5 INJECTION INTRAMUSCULAR; INTRAVENOUS EVERY 5 MIN PRN
Status: DISCONTINUED | OUTPATIENT
Start: 2021-02-15 | End: 2021-02-15 | Stop reason: HOSPADM

## 2021-02-15 RX ORDER — FENTANYL CITRATE 50 UG/ML
25 INJECTION, SOLUTION INTRAMUSCULAR; INTRAVENOUS EVERY 5 MIN PRN
Status: DISCONTINUED | OUTPATIENT
Start: 2021-02-15 | End: 2021-02-15 | Stop reason: HOSPADM

## 2021-02-15 RX ORDER — QUETIAPINE FUMARATE 100 MG/1
100 TABLET, FILM COATED ORAL 2 TIMES DAILY
Status: DISCONTINUED | OUTPATIENT
Start: 2021-02-15 | End: 2021-02-16 | Stop reason: HOSPADM

## 2021-02-15 RX ORDER — ONDANSETRON 2 MG/ML
4 INJECTION INTRAMUSCULAR; INTRAVENOUS EVERY 6 HOURS PRN
Status: DISCONTINUED | OUTPATIENT
Start: 2021-02-15 | End: 2021-02-16 | Stop reason: HOSPADM

## 2021-02-15 RX ORDER — BUPIVACAINE HYDROCHLORIDE 5 MG/ML
INJECTION, SOLUTION EPIDURAL; INTRACAUDAL
Status: DISPENSED
Start: 2021-02-15 | End: 2021-02-15

## 2021-02-15 RX ORDER — SODIUM CHLORIDE 0.9 % (FLUSH) 0.9 %
10 SYRINGE (ML) INJECTION PRN
Status: DISCONTINUED | OUTPATIENT
Start: 2021-02-15 | End: 2021-02-15 | Stop reason: HOSPADM

## 2021-02-15 RX ORDER — ONDANSETRON 2 MG/ML
INJECTION INTRAMUSCULAR; INTRAVENOUS PRN
Status: DISCONTINUED | OUTPATIENT
Start: 2021-02-15 | End: 2021-02-15 | Stop reason: SDUPTHER

## 2021-02-15 RX ORDER — LABETALOL HYDROCHLORIDE 5 MG/ML
5 INJECTION, SOLUTION INTRAVENOUS EVERY 10 MIN PRN
Status: DISCONTINUED | OUTPATIENT
Start: 2021-02-15 | End: 2021-02-15 | Stop reason: HOSPADM

## 2021-02-15 RX ORDER — MAGNESIUM HYDROXIDE 1200 MG/15ML
LIQUID ORAL CONTINUOUS PRN
Status: COMPLETED | OUTPATIENT
Start: 2021-02-15 | End: 2021-02-15

## 2021-02-15 RX ORDER — CASTOR OIL AND BALSAM, PERU 788; 87 MG/G; MG/G
OINTMENT TOPICAL EVERY 6 HOURS PRN
COMMUNITY

## 2021-02-15 RX ORDER — ONDANSETRON 2 MG/ML
4 INJECTION INTRAMUSCULAR; INTRAVENOUS
Status: DISCONTINUED | OUTPATIENT
Start: 2021-02-15 | End: 2021-02-15 | Stop reason: HOSPADM

## 2021-02-15 RX ORDER — FENTANYL CITRATE 50 UG/ML
50 INJECTION, SOLUTION INTRAMUSCULAR; INTRAVENOUS EVERY 5 MIN PRN
Status: DISCONTINUED | OUTPATIENT
Start: 2021-02-15 | End: 2021-02-15 | Stop reason: HOSPADM

## 2021-02-15 RX ORDER — OXYCODONE HYDROCHLORIDE 5 MG/1
5 TABLET ORAL EVERY 6 HOURS PRN
Status: DISCONTINUED | OUTPATIENT
Start: 2021-02-15 | End: 2021-02-16 | Stop reason: HOSPADM

## 2021-02-15 RX ORDER — LEVOFLOXACIN 5 MG/ML
500 INJECTION, SOLUTION INTRAVENOUS
Status: COMPLETED | OUTPATIENT
Start: 2021-02-15 | End: 2021-02-15

## 2021-02-15 RX ORDER — DEXAMETHASONE SODIUM PHOSPHATE 4 MG/ML
INJECTION, SOLUTION INTRA-ARTICULAR; INTRALESIONAL; INTRAMUSCULAR; INTRAVENOUS; SOFT TISSUE PRN
Status: DISCONTINUED | OUTPATIENT
Start: 2021-02-15 | End: 2021-02-15 | Stop reason: SDUPTHER

## 2021-02-15 RX ORDER — PANTOPRAZOLE SODIUM 20 MG/1
20 TABLET, DELAYED RELEASE ORAL DAILY
Status: DISCONTINUED | OUTPATIENT
Start: 2021-02-15 | End: 2021-02-16 | Stop reason: HOSPADM

## 2021-02-15 RX ORDER — SODIUM CHLORIDE, SODIUM LACTATE, POTASSIUM CHLORIDE, CALCIUM CHLORIDE 600; 310; 30; 20 MG/100ML; MG/100ML; MG/100ML; MG/100ML
INJECTION, SOLUTION INTRAVENOUS CONTINUOUS
Status: DISCONTINUED | OUTPATIENT
Start: 2021-02-15 | End: 2021-02-16

## 2021-02-15 RX ORDER — ACETAMINOPHEN 500 MG
1000 TABLET ORAL ONCE
Status: COMPLETED | OUTPATIENT
Start: 2021-02-15 | End: 2021-02-15

## 2021-02-15 RX ORDER — ACETAMINOPHEN 500 MG
1000 TABLET ORAL EVERY 6 HOURS
Status: DISCONTINUED | OUTPATIENT
Start: 2021-02-15 | End: 2021-02-16 | Stop reason: HOSPADM

## 2021-02-15 RX ORDER — BUPIVACAINE HYDROCHLORIDE 2.5 MG/ML
INJECTION, SOLUTION EPIDURAL; INFILTRATION; INTRACAUDAL PRN
Status: DISCONTINUED | OUTPATIENT
Start: 2021-02-15 | End: 2021-02-15 | Stop reason: SDUPTHER

## 2021-02-15 RX ORDER — IBUPROFEN 200 MG
1 CAPSULE ORAL 2 TIMES DAILY
COMMUNITY

## 2021-02-15 RX ORDER — LIDOCAINE HYDROCHLORIDE ANHYDROUS AND DEXTROSE MONOHYDRATE .4; 5 G/100ML; G/100ML
INJECTION, SOLUTION INTRAVENOUS CONTINUOUS PRN
Status: DISCONTINUED | OUTPATIENT
Start: 2021-02-15 | End: 2021-02-15 | Stop reason: SDUPTHER

## 2021-02-15 RX ORDER — ASPIRIN 81 MG/1
81 TABLET ORAL DAILY
COMMUNITY

## 2021-02-15 RX ORDER — LIDOCAINE HYDROCHLORIDE 20 MG/ML
INJECTION, SOLUTION INFILTRATION; PERINEURAL PRN
Status: DISCONTINUED | OUTPATIENT
Start: 2021-02-15 | End: 2021-02-15 | Stop reason: SDUPTHER

## 2021-02-15 RX ORDER — LIDOCAINE HYDROCHLORIDE 10 MG/ML
1 INJECTION, SOLUTION EPIDURAL; INFILTRATION; INTRACAUDAL; PERINEURAL
Status: DISCONTINUED | OUTPATIENT
Start: 2021-02-15 | End: 2021-02-15 | Stop reason: HOSPADM

## 2021-02-15 RX ORDER — ENALAPRILAT 2.5 MG/2ML
1.25 INJECTION INTRAVENOUS
Status: DISCONTINUED | OUTPATIENT
Start: 2021-02-15 | End: 2021-02-15 | Stop reason: HOSPADM

## 2021-02-15 RX ORDER — MECOBALAMIN 5000 MCG
5 TABLET,DISINTEGRATING ORAL NIGHTLY
Status: DISCONTINUED | OUTPATIENT
Start: 2021-02-15 | End: 2021-02-16 | Stop reason: HOSPADM

## 2021-02-15 RX ORDER — SODIUM CHLORIDE, SODIUM LACTATE, POTASSIUM CHLORIDE, CALCIUM CHLORIDE 600; 310; 30; 20 MG/100ML; MG/100ML; MG/100ML; MG/100ML
INJECTION, SOLUTION INTRAVENOUS CONTINUOUS
Status: DISCONTINUED | OUTPATIENT
Start: 2021-02-15 | End: 2021-02-15

## 2021-02-15 RX ORDER — SODIUM CHLORIDE 0.9 % (FLUSH) 0.9 %
10 SYRINGE (ML) INJECTION EVERY 12 HOURS SCHEDULED
Status: DISCONTINUED | OUTPATIENT
Start: 2021-02-15 | End: 2021-02-16 | Stop reason: HOSPADM

## 2021-02-15 RX ADMIN — PANTOPRAZOLE SODIUM 20 MG: 20 TABLET, DELAYED RELEASE ORAL at 15:53

## 2021-02-15 RX ADMIN — ENOXAPARIN SODIUM 40 MG: 40 INJECTION SUBCUTANEOUS at 08:49

## 2021-02-15 RX ADMIN — BUPIVACAINE HYDROCHLORIDE 40 ML: 2.5 INJECTION, SOLUTION EPIDURAL; INFILTRATION; INTRACAUDAL; PERINEURAL at 13:07

## 2021-02-15 RX ADMIN — LIDOCAINE HYDROCHLORIDE 60 MG: 20 INJECTION, SOLUTION INFILTRATION; PERINEURAL at 09:58

## 2021-02-15 RX ADMIN — FENTANYL CITRATE 100 MCG: 50 INJECTION, SOLUTION INTRAMUSCULAR; INTRAVENOUS at 10:53

## 2021-02-15 RX ADMIN — SODIUM CHLORIDE, POTASSIUM CHLORIDE, SODIUM LACTATE AND CALCIUM CHLORIDE: 600; 310; 30; 20 INJECTION, SOLUTION INTRAVENOUS at 08:39

## 2021-02-15 RX ADMIN — ACETAMINOPHEN 1000 MG: 500 TABLET ORAL at 15:53

## 2021-02-15 RX ADMIN — NALOXEGOL OXALATE 25 MG: 25 TABLET, FILM COATED ORAL at 08:48

## 2021-02-15 RX ADMIN — IBUPROFEN 400 MG: 200 TABLET, FILM COATED ORAL at 15:53

## 2021-02-15 RX ADMIN — SODIUM CHLORIDE, SODIUM LACTATE, POTASSIUM CHLORIDE, AND CALCIUM CHLORIDE: .6; .31; .03; .02 INJECTION, SOLUTION INTRAVENOUS at 10:36

## 2021-02-15 RX ADMIN — BUPIVACAINE 20 ML: 13.3 INJECTION, SUSPENSION, LIPOSOMAL INFILTRATION at 13:07

## 2021-02-15 RX ADMIN — METRONIDAZOLE 500 MG: 500 INJECTION, SOLUTION INTRAVENOUS at 09:55

## 2021-02-15 RX ADMIN — LEVOFLOXACIN 500 MG: 5 INJECTION, SOLUTION INTRAVENOUS at 09:55

## 2021-02-15 RX ADMIN — FENTANYL CITRATE 100 MCG: 50 INJECTION, SOLUTION INTRAMUSCULAR; INTRAVENOUS at 10:30

## 2021-02-15 RX ADMIN — PROPOFOL 50 MG: 10 INJECTION, EMULSION INTRAVENOUS at 12:34

## 2021-02-15 RX ADMIN — ACETAMINOPHEN 1000 MG: 500 TABLET ORAL at 20:57

## 2021-02-15 RX ADMIN — DEXAMETHASONE SODIUM PHOSPHATE 8 MG: 4 INJECTION, SOLUTION INTRAMUSCULAR; INTRAVENOUS at 10:05

## 2021-02-15 RX ADMIN — PAROXETINE HYDROCHLORIDE 20 MG: 20 TABLET, FILM COATED ORAL at 20:58

## 2021-02-15 RX ADMIN — SODIUM CHLORIDE, SODIUM LACTATE, POTASSIUM CHLORIDE, AND CALCIUM CHLORIDE: .6; .31; .03; .02 INJECTION, SOLUTION INTRAVENOUS at 09:55

## 2021-02-15 RX ADMIN — QUETIAPINE FUMARATE 100 MG: 100 TABLET ORAL at 20:58

## 2021-02-15 RX ADMIN — Medication 5 MG: at 22:12

## 2021-02-15 RX ADMIN — ROCURONIUM BROMIDE 100 MG: 10 INJECTION INTRAVENOUS at 09:58

## 2021-02-15 RX ADMIN — LIDOCAINE HYDROCHLORIDE ANHYDROUS AND DEXTROSE MONOHYDRATE 1.5 MG/MIN: .4; 5 INJECTION, SOLUTION INTRAVENOUS at 09:58

## 2021-02-15 RX ADMIN — ROCURONIUM BROMIDE 20 MG: 10 INJECTION INTRAVENOUS at 11:33

## 2021-02-15 RX ADMIN — PROPOFOL 200 MG: 10 INJECTION, EMULSION INTRAVENOUS at 09:58

## 2021-02-15 RX ADMIN — SODIUM CHLORIDE, POTASSIUM CHLORIDE, SODIUM LACTATE AND CALCIUM CHLORIDE: 600; 310; 30; 20 INJECTION, SOLUTION INTRAVENOUS at 15:53

## 2021-02-15 RX ADMIN — ACETAMINOPHEN 1000 MG: 500 TABLET ORAL at 08:48

## 2021-02-15 RX ADMIN — ONDANSETRON 4 MG: 2 INJECTION INTRAMUSCULAR; INTRAVENOUS at 10:05

## 2021-02-15 RX ADMIN — SUGAMMADEX 200 MG: 100 INJECTION, SOLUTION INTRAVENOUS at 12:41

## 2021-02-15 ASSESSMENT — PULMONARY FUNCTION TESTS
PIF_VALUE: 19
PIF_VALUE: 29
PIF_VALUE: 29
PIF_VALUE: 20
PIF_VALUE: 34
PIF_VALUE: 1
PIF_VALUE: 15
PIF_VALUE: 33
PIF_VALUE: 21
PIF_VALUE: 24
PIF_VALUE: 19
PIF_VALUE: 19
PIF_VALUE: 20
PIF_VALUE: 19
PIF_VALUE: 35
PIF_VALUE: 34
PIF_VALUE: 34
PIF_VALUE: 25
PIF_VALUE: 34
PIF_VALUE: 19
PIF_VALUE: 34
PIF_VALUE: 20
PIF_VALUE: 11
PIF_VALUE: 34
PIF_VALUE: 20
PIF_VALUE: 36
PIF_VALUE: 35
PIF_VALUE: 20
PIF_VALUE: 12
PIF_VALUE: 19
PIF_VALUE: 19
PIF_VALUE: 1
PIF_VALUE: 19
PIF_VALUE: 18
PIF_VALUE: 34
PIF_VALUE: 19
PIF_VALUE: 30
PIF_VALUE: 34
PIF_VALUE: 35
PIF_VALUE: 20
PIF_VALUE: 20
PIF_VALUE: 19
PIF_VALUE: 26
PIF_VALUE: 34
PIF_VALUE: 25
PIF_VALUE: 25
PIF_VALUE: 19
PIF_VALUE: 34
PIF_VALUE: 19
PIF_VALUE: 1
PIF_VALUE: 34
PIF_VALUE: 20
PIF_VALUE: 19
PIF_VALUE: 23
PIF_VALUE: 19
PIF_VALUE: 26
PIF_VALUE: 1
PIF_VALUE: 1
PIF_VALUE: 34
PIF_VALUE: 19
PIF_VALUE: 20
PIF_VALUE: 20
PIF_VALUE: 19
PIF_VALUE: 34
PIF_VALUE: 20
PIF_VALUE: 34
PIF_VALUE: 34
PIF_VALUE: 20
PIF_VALUE: 20
PIF_VALUE: 19
PIF_VALUE: 34
PIF_VALUE: 19
PIF_VALUE: 19
PIF_VALUE: 33
PIF_VALUE: 27
PIF_VALUE: 34
PIF_VALUE: 19
PIF_VALUE: 34
PIF_VALUE: 25
PIF_VALUE: 19

## 2021-02-15 ASSESSMENT — PAIN SCALES - GENERAL
PAINLEVEL_OUTOF10: 0
PAINLEVEL_OUTOF10: 2

## 2021-02-15 ASSESSMENT — PAIN DESCRIPTION - FREQUENCY: FREQUENCY: INTERMITTENT

## 2021-02-15 NOTE — ANESTHESIA PRE PROCEDURE
Department of Anesthesiology  Preprocedure Note       Name:  Fletcher Healy   Age:  72 y.o.  :  1955                                          MRN:  [id number]         Date:  2/15/2021      Surgeon: Faustina Ricer):  Phill Vale MD    Procedure: Procedure(s):  LAPAROSCOPIC VERSUS OPEN PARTIAL COLECTOMY    Medications prior to admission:   Prior to Admission medications    Medication Sig Start Date End Date Taking? Authorizing Provider   polyethylene glycol (GLYCOLAX) 17 GM/SCOOP powder Take 17 g by mouth daily as needed   Yes Historical Provider, MD   calcium carbonate 600 MG TABS tablet Take 1 tablet by mouth 2 times daily   Yes Historical Provider, MD   PARoxetine (PAXIL) 20 MG tablet Take 20 mg by mouth See Admin Instructions Take 3 (60 mg) in the morning and 1 (20 mg) in the evening. Yes Historical Provider, MD   ALPRAZolam Kathy Gonzalez) 1 MG tablet Take 1 tablet by mouth every 6 hours for 180 days.  .  May take 1 additional tab as needed for anxiety 9/20/20 3/19/21 Yes Balaji Chase DO   finasteride (PROSCAR) 5 MG tablet Take 5 mg by mouth daily   Yes Historical Provider, MD   melatonin 3 MG TABS tablet Take 6 mg by mouth nightly   Yes Historical Provider, MD   pantoprazole (PROTONIX) 20 MG tablet Take 20 mg by mouth daily   Yes Historical Provider, MD   pravastatin (PRAVACHOL) 40 MG tablet Take 40 mg by mouth nightly   Yes Historical Provider, MD   QUEtiapine (SEROQUEL) 100 MG tablet Take 100 mg by mouth 2 times daily   Yes Historical Provider, MD   acetaminophen (TYLENOL) 325 MG tablet Take 650 mg by mouth every 6 hours as needed for Pain   Yes Historical Provider, MD   vitamin D (CHOLECALCIFEROL) 25 MCG (1000 UT) TABS tablet Take 1,000 Units by mouth daily   Yes Historical Provider, MD   Multiple Vitamins-Minerals (CENTRUM SILVER) TABS Take by mouth daily   Yes Historical Provider, MD       Current medications:    Current Facility-Administered Medications   Medication Dose Route Frequency Provider Last Rate Last Admin    lactated ringers infusion   Intravenous Continuous Cleveland Chely,  mL/hr at 02/15/21 3840 New Bag at 02/15/21 0839    metronidazole (FLAGYL) 500 mg in NaCl 100 mL IVPB premix  500 mg Intravenous On Call to Via Reshma Kessler MD        And    levoFLOXacin (LEVAQUIN) 500 MG/100ML infusion 500 mg  500 mg Intravenous On Call to Via Reshma Kessler MD        sodium chloride flush 0.9 % injection 10 mL  10 mL Intravenous 2 times per day Skye Storm MD        sodium chloride flush 0.9 % injection 10 mL  10 mL Intravenous PRN Skye Storm MD        lactated ringers infusion   Intravenous Continuous Luis Mcnamara MD        sodium chloride flush 0.9 % injection 10 mL  10 mL Intravenous 2 times per day Luis Mcnamara MD        sodium chloride flush 0.9 % injection 10 mL  10 mL Intravenous PRN Luis Mcnamara MD        lidocaine PF 1 % injection 1 mL  1 mL Intradermal Once PRN Luis Mcnamara MD           Allergies:  No Known Allergies    Problem List:    Patient Active Problem List   Diagnosis Code    Weakness R53.1    CIDP (chronic inflammatory demyelinating polyneuropathy) ([facility]) G61.81    Urinary retention R33.9    Generalized anxiety disorder F41.1    History of pulmonary embolism Z86.711    Mixed obsessional thoughts and acts F42.2    Hyperlipidemia E78.5    Dementia with behavioral disturbance ([facility] [address] F03.91    Vitamin D deficiency E55.9    Microcytic anemia D50.9    Positive FIT (fecal immunochemical test) R19.5    BPH with obstruction/lower urinary tract symptoms N40.1, N13.8    Nursing home resident Z59.3    Gastroesophageal reflux disease without esophagitis K21.9    Stage 3a chronic kidney disease N18.31       Past Medical History:        Diagnosis Date    Adenomatous colon polyp 02/03/2021    Multiple    CIDP (chronic inflammatory demyelinating polyneuropathy) ([facility])     Clostridium difficile colitis 2017    Constipation     Deep vein thrombosis (DVT) of left lower extremity ([facility] [address] 08/2018    Dementia with behavioral disturbance ([facility] [address]     Depression with anxiety     Difficulty walking     Discitis of cervical region 08/2017    Dyslipidemia     E coli bacteremia 01/24/2020    AMARJIT (generalized anxiety disorder)     Hyperlipidemia     OCD (obsessive compulsive disorder)     Overactive bladder     Primary insomnia     Pulmonary embolism ([facility] [address] 08/2017    Pyelonephritis of right kidney 01/24/2020    with sepsis    Rhabdomyolysis 11/05/2016    Stage 3a chronic kidney disease     Urinary retention 11/05/2016    Vitamin D deficiency        Past Surgical History:        Procedure Laterality Date    CERVICAL FUSION  08/2017    C3-T1 Laminectomy with fusion    COLONOSCOPY N/A 2/3/2021    COLONOSCOPY POLYPECTOMY SNARE/COLD BIOPSY performed by Precious Branch MD at [facility]      R side    OTHER SURGICAL HISTORY  11/10/2016    CYSTOSCOPY       Social History:    Social History     Tobacco Use    Smoking status: Never Smoker    Smokeless tobacco: Never Used   Substance Use Topics    Alcohol use: No     Alcohol/week: 0.0 standard drinks                                Counseling given: Not Answered      Vital Signs (Current):   Vitals:    02/10/21 1240 02/15/21 0817   BP:  (!) 150/91   Pulse:  67   Resp:  18   Temp:  97.7 °F (36.5 °C)   TempSrc:  Oral   SpO2:  97%   Weight: 194 lb 4 oz (88.1 kg) 197 lb (89.4 kg)   Height: 5' 11\" (1.803 m) 5' 9\" (1.753 m)                                              BP Readings from Last 3 Encounters:   02/15/21 (!) 150/91   02/13/21 128/74   02/09/21 (!) 173/95       NPO Status: Time of last liquid consumption: 2359                        Time of last solid consumption: 1900                        Date of last liquid consumption: 02/15/21                        Date of last solid food consumption: 02/13/21    BMI:   Wt Readings from Last 3 Encounters:   02/15/21 197 lb (89.4 kg)   02/13/21 194 lb (88 kg) 02/09/21 203 lb (92.1 kg)     Body mass index is 29.09 kg/m². CBC:   Lab Results   Component Value Date    WBC 8.1 11/11/2016    RBC 3.30 11/11/2016    HGB 10.5 11/11/2016    HCT 29.9 11/11/2016    MCV 90.7 11/11/2016    RDW 13.4 11/11/2016     11/11/2016       CMP:   Lab Results   Component Value Date     11/11/2016    K 4.1 11/11/2016     11/11/2016    CO2 29 11/11/2016    BUN 10 11/11/2016    CREATININE 0.6 11/11/2016    GFRAA >60 11/11/2016    AGRATIO 0.8 08/02/2016    LABGLOM >60 11/11/2016    GLUCOSE 109 11/11/2016    PROT 7.8 08/02/2016    CALCIUM 8.3 11/11/2016    BILITOT 0.6 08/02/2016    ALKPHOS 105 08/02/2016    AST 53 08/02/2016     08/02/2016       POC Tests: No results for input(s): POCGLU, POCNA, POCK, POCCL, POCBUN, POCHEMO, POCHCT in the last 72 hours. Coags:   Lab Results   Component Value Date    PROTIME 11.9 07/26/2016    INR 1.04 07/26/2016       HCG (If Applicable): No results found for: PREGTESTUR, PREGSERUM, HCG, HCGQUANT     ABGs: No results found for: PHART, PO2ART, BFO2VPB, MMR1IYZ, BEART, L0IQLVLZ     Type & Screen (If Applicable):  No results found for: LABABO, LABRH    Drug/Infectious Status (If Applicable):  No results found for: HIV, HEPCAB    COVID-19 Screening (If Applicable): No results found for: COVID19      Anesthesia Evaluation  Patient summary reviewed no history of anesthetic complications:   Airway: Mallampati: I  TM distance: >3 FB   Neck ROM: full  Mouth opening: > = 3 FB Dental:      Comment: Missing teeth    Pulmonary:                             ROS comment: Hx PE   Cardiovascular:                      Neuro/Psych:   (+) psychiatric history (anxiety   OCD):depression/anxiety              ROS comment: CIDP (chronic inflammatory demyelinating polyneuropathy) ([address]  Dementia with behavioral disturbance  Difficulty walking GI/Hepatic/Renal:   (+) GERD:, renal disease: CRI,          ROS comment: BPH.    Endo/Other: Abdominal:           Vascular:                                        Anesthesia Plan      general     ASA 3     (Agrees to block if needed )  Induction: intravenous. Anesthetic plan and risks discussed with patient.                       Dave Lundy MD   2/15/2021

## 2021-02-15 NOTE — H&P
None    Number of children: None    Years of education: None    Highest education level: None   Occupational History    None   Social Needs    Financial resource strain: None    Food insecurity     Worry: None     Inability: None    Transportation needs     Medical: None     Non-medical: None   Tobacco Use    Smoking status: Never Smoker    Smokeless tobacco: Never Used   Substance and Sexual Activity    Alcohol use: No     Alcohol/week: 0.0 standard drinks    Drug use: No    Sexual activity: None   Lifestyle    Physical activity     Days per week: None     Minutes per session: None    Stress: None   Relationships    Social connections     Talks on phone: None     Gets together: None     Attends Jainism service: None     Active member of club or organization: None     Attends meetings of clubs or organizations: None     Relationship status: None    Intimate partner violence     Fear of current or ex partner: None     Emotionally abused: None     Physically abused: None     Forced sexual activity: None   Other Topics Concern    None   Social History Narrative    None         Medications Prior to Admission:      Prior to Admission medications    Medication Sig Start Date End Date Taking? Authorizing Provider   polyethylene glycol (GLYCOLAX) 17 GM/SCOOP powder Take 17 g by mouth daily as needed   Yes Historical Provider, MD   calcium carbonate 600 MG TABS tablet Take 1 tablet by mouth 2 times daily   Yes Historical Provider, MD   PARoxetine (PAXIL) 20 MG tablet Take 20 mg by mouth See Admin Instructions Take 3 (60 mg) in the morning and 1 (20 mg) in the evening. Yes Historical Provider, MD   ALPRAZolam Palma Ahmadi) 1 MG tablet Take 1 tablet by mouth every 6 hours for 180 days.  .  May take 1 additional tab as needed for anxiety 9/20/20 3/19/21 Yes Balaji Cahse,    finasteride (PROSCAR) 5 MG tablet Take 5 mg by mouth daily   Yes Historical Provider, MD   melatonin 3 MG TABS tablet Take 6 mg by mouth nightly   Yes Historical Provider, MD   pantoprazole (PROTONIX) 20 MG tablet Take 20 mg by mouth daily   Yes Historical Provider, MD   pravastatin (PRAVACHOL) 40 MG tablet Take 40 mg by mouth nightly   Yes Historical Provider, MD   QUEtiapine (SEROQUEL) 100 MG tablet Take 100 mg by mouth 2 times daily   Yes Historical Provider, MD   acetaminophen (TYLENOL) 325 MG tablet Take 650 mg by mouth every 6 hours as needed for Pain   Yes Historical Provider, MD   vitamin D (CHOLECALCIFEROL) 25 MCG (1000 UT) TABS tablet Take 1,000 Units by mouth daily   Yes Historical Provider, MD   Multiple Vitamins-Minerals (CENTRUM SILVER) TABS Take by mouth daily   Yes Historical Provider, MD         Allergies:  Patient has no known allergies. PHYSICAL EXAM:      BP (!) 150/91   Pulse 67   Temp 97.7 °F (36.5 °C) (Oral)   Resp 18   Ht 5' 9\" (1.753 m)   Wt 197 lb (89.4 kg)   SpO2 97%   BMI 29.09 kg/m²      Airway:  Airway patent with no audible stridor    Heart:  regular rate and rhythm, No murmur noted    Lungs:  No increased work of breathing, good air exchange, clear to auscultation bilaterally, no crackles or wheezing    Abdomen:  soft, non-distended, non-tender, no rebound tenderness or guarding, normal active bowel sounds and no masses palpated    ASSESSMENT AND PLAN     Patient is a 72 y.o. male with above specified procedure planned. 1.  Patient seen and focused exam done today- no new changes since last physical exam on 2/13/21    2. Access to ancillary services are available per request of the provider.     EVERETTE Michael - CNP     2/15/2021

## 2021-02-15 NOTE — CONSULTS
Clinical Pharmacy Progress Note  Medication History     Admit Date: 2/15/2021    Asked by Dr. Maik Kc to clarify home medications. List of current medications patient is taking is complete. Home medication list in EPIC updated to reflect changes noted below. Source of information: [facility] med list       The following medications were added to admission medication list:  Loperamide prn   Venelex prn     The following medication instructions/doses were adjusted to reflect how patient is taking:  Paroxetine dose is 60mg in AM and 20mg in PM   Alprazolam dose is 1mg QID + an additional 1mg daily as needed       Please call with questions.   Charity Maldonado, PharmD, [address] [place] [address] Pharmacy: [phone]  [address] [place] [address]: [phone]  2/15/2021 3:49 PM

## 2021-02-15 NOTE — ANESTHESIA POSTPROCEDURE EVALUATION
Department of Anesthesiology  Postprocedure Note    Patient: Viraj Richey  MRN: [id number]  [place]: 1955  Date of evaluation: 2/15/2021  Time:  2:27 PM     Procedure Summary     Date: 02/15/21 Room / Location: [address] [place] [address] 01 / [facility]    Anesthesia Start: 9677 Anesthesia Stop: 5476    Procedure: LAPAROSCOPIC  PARTIAL COLECTOMY (N/A Abdomen) Diagnosis:       Adenoma of transverse colon      (Adenoma of transverse colon [D12.3])    Surgeons: Germaine Valentine MD Responsible Provider: Valarie Watts MD    Anesthesia Type: general ASA Status: 3          Anesthesia Type: general    Castro Phase I: Castro Score: 8    Castro Phase II:      Last vitals: Reviewed and per EMR flowsheets.        Anesthesia Post Evaluation    Patient location during evaluation: PACU  Patient participation: complete - patient participated  Level of consciousness: awake  Airway patency: patent  Nausea & Vomiting: no nausea and no vomiting  Complications: no  Cardiovascular status: hemodynamically stable  Respiratory status: acceptable  Hydration status: stable

## 2021-02-15 NOTE — ANESTHESIA PROCEDURE NOTES
Peripheral Block    Patient location during procedure: OR  Start time: 2/15/2021 1:05 PM  End time: 2/15/2021 1:07 PM  Staffing  Anesthesiologist: Bryant Fair MD  Resident/CRNA: EVERETTE Arguelles - CRNA  Preanesthetic Checklist  Completed: patient identified, IV checked, site marked, risks and benefits discussed, surgical consent, monitors and equipment checked, pre-op evaluation, timeout performed, anesthesia consent given, oxygen available and patient being monitored  Peripheral Block  Patient position: supine  Prep: ChloraPrep  Patient monitoring: cardiac monitor, continuous pulse ox, frequent blood pressure checks and IV access  Block type: TAP  Laterality: bilateral  Injection technique: single-shot  Guidance: nerve stimulator and ultrasound guided  Local infiltration: lidocaine  Infiltration strength: 1 %  Dose: 3 mL  Provider prep: mask and sterile gloves  Local infiltration: lidocaine  Needle  Needle gauge: 21 G  Needle length: 10 cm  Needle localization: ultrasound guidance  Assessment  Injection assessment: negative aspiration for heme, no paresthesia on injection and local visualized surrounding nerve on ultrasound  Paresthesia pain: none  Slow fractionated injection: yes  Hemodynamics: stable  Additional Notes  Immediately prior to procedure a \"time out\" was called to verify the correct patient, allergies, laterality, procedure and equipment. Time out performed with Josué Patel RN    Local Anesthetic: 0.25 %  Bupivacaine 20ml and exparel 10 ml per side in 5 ml increments after negative aspiration each time. subcostal and mid axillary approach, needle visualized the whole time.         Reason for block: post-op pain management and at surgeon's request

## 2021-02-15 NOTE — BRIEF OP NOTE
Brief Postoperative Note      Patient: Chani Holt  YOB: 1955  MRN: [id number]    Date of Procedure: 2/15/2021    Pre-Op Diagnosis: Adenoma of transverse colon [D12.3]    Post-Op Diagnosis: Same       Procedure(s):  LAPAROSCOPIC  PARTIAL COLECTOMY    Surgeon(s):  Rad Quintana MD    Assistant:  Resident: Venancio Landon DO    Anesthesia: General    Estimated Blood Loss (mL): 158    Complications: None    Specimens:   ID Type Source Tests Collected by Time Destination   A : EXTENDED RIGHT COLECTOMY Tissue Tissue SURGICAL PATHOLOGY Rad Quintana MD 2/15/2021 1145        Implants:  * No implants in log *      Drains:   Urethral Catheter Straight-tip 16 fr (Active)       Findings: Transverse colon polyp removed with good margins     Electronically signed by Venancio Landon DO on 2/15/2021 at 1:02 PM

## 2021-02-16 VITALS
DIASTOLIC BLOOD PRESSURE: 67 MMHG | SYSTOLIC BLOOD PRESSURE: 115 MMHG | HEART RATE: 89 BPM | BODY MASS INDEX: 29.18 KG/M2 | OXYGEN SATURATION: 94 % | RESPIRATION RATE: 16 BRPM | WEIGHT: 197 LBS | HEIGHT: 69 IN | TEMPERATURE: 98.4 F

## 2021-02-16 LAB — SARS-COV-2, NAAT: NOT DETECTED

## 2021-02-16 PROCEDURE — 6370000000 HC RX 637 (ALT 250 FOR IP): Performed by: STUDENT IN AN ORGANIZED HEALTH CARE EDUCATION/TRAINING PROGRAM

## 2021-02-16 PROCEDURE — 99024 POSTOP FOLLOW-UP VISIT: CPT | Performed by: SURGERY

## 2021-02-16 PROCEDURE — 6360000002 HC RX W HCPCS: Performed by: STUDENT IN AN ORGANIZED HEALTH CARE EDUCATION/TRAINING PROGRAM

## 2021-02-16 PROCEDURE — 87635 SARS-COV-2 COVID-19 AMP PRB: CPT

## 2021-02-16 PROCEDURE — 2580000003 HC RX 258: Performed by: STUDENT IN AN ORGANIZED HEALTH CARE EDUCATION/TRAINING PROGRAM

## 2021-02-16 RX ORDER — OXYCODONE HYDROCHLORIDE 5 MG/1
5 TABLET ORAL EVERY 6 HOURS PRN
Qty: 15 TABLET | Refills: 0 | Status: SHIPPED | OUTPATIENT
Start: 2021-02-16 | End: 2021-02-19

## 2021-02-16 RX ORDER — ONDANSETRON 4 MG/1
4 TABLET, ORALLY DISINTEGRATING ORAL EVERY 8 HOURS PRN
Qty: 15 TABLET | Refills: 0 | Status: SHIPPED | OUTPATIENT
Start: 2021-02-16

## 2021-02-16 RX ADMIN — ACETAMINOPHEN 1000 MG: 500 TABLET ORAL at 03:20

## 2021-02-16 RX ADMIN — PAROXETINE HYDROCHLORIDE 60 MG: 20 TABLET, FILM COATED ORAL at 09:14

## 2021-02-16 RX ADMIN — QUETIAPINE FUMARATE 100 MG: 100 TABLET ORAL at 09:14

## 2021-02-16 RX ADMIN — PANTOPRAZOLE SODIUM 20 MG: 20 TABLET, DELAYED RELEASE ORAL at 09:14

## 2021-02-16 RX ADMIN — Medication 10 ML: at 09:15

## 2021-02-16 RX ADMIN — ACETAMINOPHEN 1000 MG: 500 TABLET ORAL at 11:27

## 2021-02-16 RX ADMIN — ENOXAPARIN SODIUM 40 MG: 40 INJECTION SUBCUTANEOUS at 09:15

## 2021-02-16 ASSESSMENT — PAIN SCALES - GENERAL: PAINLEVEL_OUTOF10: 0

## 2021-02-16 NOTE — DISCHARGE SUMMARY
Discharge Summary      Patient:    Zak Trimble    Admit Date:   2/15/2021  7:53 AM    Discharge Date:   02/16/2021    Admitting Physician:   Miguel Ángel Pete MD     Discharge Physician:   same    Admitting Diagnosis:  Adenoma of transverse colon    Discharge Diagnosis:   same     Past Medical History:   Diagnosis Date    Adenomatous colon polyp 02/03/2021    Multiple    CIDP (chronic inflammatory demyelinating polyneuropathy) ([facility])     Clostridium difficile colitis 2017    Constipation     Deep vein thrombosis (DVT) of left lower extremity ([facility] [address] 08/2018    Dementia with behavioral disturbance ([facility] [address]     Depression with anxiety     Difficulty walking     Discitis of cervical region 08/2017    Dyslipidemia     E coli bacteremia 01/24/2020    AMARJIT (generalized anxiety disorder)     Hyperlipidemia     OCD (obsessive compulsive disorder)     Overactive bladder     Primary insomnia     Pulmonary embolism ([facility] [address] 08/2017    Pyelonephritis of right kidney 01/24/2020    with sepsis    Rhabdomyolysis 11/05/2016    Stage 3a chronic kidney disease     Urinary retention 11/05/2016    Vitamin D deficiency         Indication for Admission:   Patient presented to [facility] to undergo a laparoscopic partial colectomy secondary to adenoma of the transverse colon. Hospital Course:   POD0 - Patient presented to [facility] to undergo the above stated procedure. The patient recovered well in the post-operative recovery period. Patient afebrile, hemodynamically stable, and was making adequate urine, and was given IVF until adequate PO intake was achieved. Overnight, the patient's pain was well controlled. POD1 - Patient's mejia catheter was removed in the morning and patient was voiding appropriately. The patient tolerated breakfast with no nausea or vomiting. The patient was discharged to [facility] in stable condition later this hospital day with transportation arranged by social work.      Discharge physical exam:  Chest/Lungs: CTAB, no crackles/rales, wheezes/rhonchi, normal effort  Cardiovascular: RRR, no murmurs/gallops/rubs  Abdomen: soft, appropriately-tender, non-distended, no guarding/rigidity, surgical incisions c/d/i with surgical glue without surrounding erythema or drainage  Extremities: no edema, no cyanosis  Neuro: A&Ox3, no focal deficits, sensation intact    [facility]    Condition at discharge:  Stable    Discharge Instructions:  See separate form    Patient Instructions:      Medication List      START taking these medications    ondansetron 4 MG disintegrating tablet  Commonly known as: ZOFRAN-ODT  Take 1 tablet by mouth every 8 hours as needed for Nausea or Vomiting     oxyCODONE 5 MG immediate release tablet  Commonly known as: ROXICODONE  Take 1 tablet by mouth every 6 hours as needed for Pain for up to 3 days. CONTINUE taking these medications    acetaminophen 325 MG tablet  Commonly known as: TYLENOL     ALPRAZolam 1 MG tablet  Commonly known as: Xanax  Take 1 tablet by mouth every 6 hours for 180 days.  .  May take 1 additional tab as needed for anxiety     aspirin 81 MG EC tablet     calcium carbonate 1250 (500 Ca) MG tablet  Commonly known as: OYSTER SHELL CALCIUM 500 mg     Centrum Silver Tabs     finasteride 5 MG tablet  Commonly known as: PROSCAR     loperamide 2 MG capsule  Commonly known as: IMODIUM     melatonin 3 MG Tabs tablet     pantoprazole 20 MG tablet  Commonly known as: PROTONIX     * PARoxetine 20 MG tablet  Commonly known as: PAXIL     * PARoxetine 20 MG tablet  Commonly known as: PAXIL     polyethylene glycol 17 GM/SCOOP powder  Commonly known as: GLYCOLAX     pravastatin 40 MG tablet  Commonly known as: PRAVACHOL     QUEtiapine 100 MG tablet  Commonly known as: SEROQUEL     Venelex Oint ointment     vitamin D 25 MCG (1000 UT) Tabs tablet  Commonly known as: CHOLECALCIFEROL         * This list has 2 medication(s) that are the same as other medications prescribed for you. Read the directions carefully, and ask your doctor or other care provider to review them with you.                Where to Get Your Medications      You can get these medications from any pharmacy    Bring a paper prescription for each of these medications  · ondansetron 4 MG disintegrating tablet  · oxyCODONE 5 MG immediate release tablet         Alexa Bernardo DO  02/16/21  3:54 PM

## 2021-02-16 NOTE — DISCHARGE INSTR - COC
Continuity of Care Form    Patient Name: Marino Mancuso   :  1955  MRN:  [id number]    Admit date:  2/15/2021  Discharge date:  21    Code Status Order: Full Code   Advance Directives:   [facility] Documentation       Date/Time Healthcare Directive Type of Healthcare Directive Copy in [facility] [address] Agent's Name Healthcare Agent's Phone Number    02/15/21 1821  Yes, patient has an advance directive for healthcare treatment  Durable power of  for health care  No, copy requested from family  --  josue  see emergency contact    02/15/21 0838  Unknown, patient unable to respond due to medical condition contact pt's Brother/ [facility] [phone]  --  --  --  --  --    02/10/21 1243  Yes, patient has an advance directive for healthcare treatment  Durable power of  for health care  --  --  Samaria Ayala  --            Admitting Physician:  Radha Rosario MD  PCP: Pete Marin DO    Discharging Nurse: Jaqueline Klein 23 Unit/Room#: 5700/1061-40  Discharging Unit Phone Number: [phone]    Emergency Contact:   Extended Emergency Contact Information  Primary Emergency Contact: care,first  Home Phone: [phone]  Mobile Phone: [phone]  Relation: Other  Secondary Emergency Contact: [address] Phone: [phone]  Mobile Phone: [phone]  Relation: Brother/Sister    Past Surgical History:  Past Surgical History:   Procedure Laterality Date    CERVICAL FUSION  2017    C3-T1 Laminectomy with fusion    COLONOSCOPY N/A 2/3/2021    COLONOSCOPY POLYPECTOMY SNARE/COLD BIOPSY performed by Radha Mckinney MD at [address] 2/15/2021    [address] [place] [address] [facility]   [facility]  11/10/2016    CYSTOSCOPY       Immunization History:   Immunization History   Administered Date(s) Administered    COVID-19, Pfizer, 30mcg/0.3ml Dose 01/07/2021    Influenza Virus Vaccine 11/05/2015, 10/28/2020    Influenza, Intradermal, Quadrivalent, Preservative Free 10/26/2016    Pneumococcal Conjugate 13-valent ([id number]) 12/10/2018    Pneumococcal Polysaccharide ([id number]) 08/02/2016       Active Problems:  Patient Active Problem List   Diagnosis Code    Weakness R53.1    CIDP (chronic inflammatory demyelinating polyneuropathy) ([facility]) G61.81    Urinary retention R33.9    Generalized anxiety disorder F41.1    History of pulmonary embolism Z86.711    Mixed obsessional thoughts and acts F42.2    Hyperlipidemia E78.5    Dementia with behavioral disturbance ([facility]) F03.91    Vitamin D deficiency E55.9    Microcytic anemia D50.9    Positive FIT (fecal immunochemical test) R19.5    BPH with obstruction/lower urinary tract symptoms N40.1, N13.8    Nursing home resident Z59.3    Gastroesophageal reflux disease without esophagitis K21.9    Stage 3a chronic kidney disease N18.31    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       Isolation/Infection:   Isolation            No Isolation          Patient Infection Status       None to display            Nurse Assessment:  Last Vital Signs: /65   Pulse 75   Temp 98.1 °F (36.7 °C) (Oral)   Resp 18   Ht 5' 9\" (1.753 m)   Wt 197 lb (89.4 kg)   SpO2 95%   BMI 29.09 kg/m²     Last documented pain score (0-10 scale): Pain Level: 0  Last Weight:   Wt Readings from Last 1 Encounters:   02/15/21 197 lb (89.4 kg)     Mental Status:  oriented, alert, coherent and logical    IV Access:  - None    Nursing Mobility/ADLs:  Walking   Assisted  Transfer  Assisted  Bathing  Assisted  Dressing  Assisted  Toileting  Assisted  Feeding  Assisted  Med Admin  Assisted  Med Delivery   whole    Wound Care Documentation and Therapy:        Elimination:  Continence:   · Bowel:  Yes  · Bladder: Yes  Urinary Catheter: None   Colostomy/Ileostomy/Ileal Conduit: No       Date of Last BM: 2/16/21     Intake/Output Summary (Last 24 hours) at 2/16/2021 0900  Last data filed at 2/16/2021 0327  Gross per 24 hour   Intake 3124.1 ml   Output 2725 ml   Net 399.1 ml     I/O last 3 completed shifts: In: 3124.1 [P.O.:120; I.V.:3004.1]  Out: 2725 [Urine:2625; Blood:100]    Safety Concerns: At Risk for Falls    Impairments/Disabilities:      None    Nutrition Therapy:  Current Nutrition Therapy:   - Oral Diet:  General    Routes of Feeding: Oral  Liquids: No Restrictions  Daily Fluid Restriction: no  Last Modified Barium Swallow with Video (Video Swallowing Test): not done    Treatments at the Time of Hospital Discharge:   Respiratory Treatments: none  Oxygen Therapy:  is not on home oxygen therapy.   Ventilator:    - No ventilator support    Rehab Therapies: Physical Therapy and Occupational Therapy  Weight Bearing Status/Restrictions: No weight bearing restirctions  Other Medical Equipment (for information only, NOT a DME order):  walker  Other Treatments: n/a    Patient's personal belongings (please select all that are sent with patient):  None    RN SIGNATURE:  Electronically signed by Madhavi Davis RN on 2/16/21 at 11:53 AM EST    CASE MANAGEMENT/SOCIAL WORK SECTION    Inpatient Status Date: ***    Readmission Risk Assessment Score:  Readmission Risk              Risk of Unplanned Readmission:        10           Discharging to Facility/ [address] [address], [address] [id number]       Phone: [phone]       Fax: [phone]        ·     / signature: Electronically signed by Parminder Negrete RN on 2/16/21 at 10:04 AM EST    PHYSICIAN SECTION    Prognosis: Good    Condition at Discharge: Stable    Rehab Potential (if transferring to Rehab): Good    Recommended Labs or Other Treatments After Discharge: no additional labs or dressing changes indicated    Physician Certification: I certify the above information and transfer of Prudence Du  is necessary for the continuing treatment of the diagnosis listed and that he requires [place] for less 30 days. Update Admission H&P: No change in H&P    PHYSICIAN SIGNATURE:  Electronically signed by EVERETTE Narayan CNP on 2/16/21 at 9:01 AM EST      - No lifting >8lbs or a gallon of milk for 2 weeks. - No driving while on narcotics  Otherwise, you can drive when you can sit comfortably behind the steering wheel and can slam on the brake without it hurting or turn the wheel sharply without it hurting. Practice this when sitting the car with it parked in your driveway before trying to drive. - May shower, let water run over incision sites. No soaking in tub, hot tub, or pool. Do not submerge incision site in water.   - Notify MD immediately if experierencing fevers/chills, nausea/vomiting, pus-like discharge from incision sites, redness swelling, or warmth to incisions.     -Diet: regular as tolerated      PATIENT SHOULD NOT START HIS ASPIRIN UNTIL Thursday February 18, 2021  - Follow up with Dr. Efren Elena in 2 weeks - call [phone] for appointment.

## 2021-02-16 NOTE — OP NOTE
[address]                [address]                                OPERATIVE REPORT    PATIENT NAME: Shon Mclean                      :        1955  MED REC NO:   [id number]                          ROOM:       5323  ACCOUNT NO:   [de-identified]                           ADMIT DATE: 02/15/2021  PROVIDER:     Heena Escobar. Tania Alba MD    DATE OF PROCEDURE:  02/15/2021    SURGEON:  Heena Escobar. Tania Alba MD    ASSISTANT:  Kaelyn Maldonado, PGY-4, resident. PREOPERATIVE DIAGNOSIS:  Adenoma of transverse colon. POSTOPERATIVE DIAGNOSIS:  Adenoma of transverse colon. OPERATIONS PERFORMED:  Laparoscopic extended right colectomy with  ileotransverse anastomosis. ANESTHESIA:  General endotracheal.    COMPLICATIONS:  None. SPECIMEN:  Extended right colectomy. ESTIMATED BLOOD LOSS:  100 mL. INDICATIONS:  The patient is a 77-year-old male who recently underwent  colonoscopy. He was found to have a large endoscopic unresectable  polypoid mass of the proximal transverse colon. Biopsies did show  adenomatous tissue. He then came to see me in the office for  consultation. He does have some psychiatric issues, so he was  accompanied by his brother. He lives in a long-term care facility. Because of this, I had a long discussion with him and his brother  regarding the recommendation to proceed with laparoscopic versus open  partial colectomy. I discussed with them the risks, benefits, and  alternatives of the procedure including but not limited to risks of  bleeding, infection, anastomotic leak, hernia formation, need for  additional operations, damage to intraabdominal structures including but  not limited to bowel, bladder, ureter as well as neurovascular  structures. He understood potential need for an open operation and  potential ostomy. He underwent preoperative clearance and was cleared  by his primary care physician. PROCEDURE DETAILS:  The patient was brought to the operating theater,  placed supine on the operating table. General endotracheal intubation  was performed by Anesthesiology. The patient was placed in the supine  position. His left arm was padded and tucked. Gonsalez catheter was  placed revealing clear yellow urine. His abdomen was prepped and draped  in the usual sterile fashion using chlorhexidine prep solution. Timeout  was performed confirming the patient's identity as well as the operative  site. Antibiotics were confirmed to be perfusing. All safety points  were followed. SCDs were on and functioning. Lovenox was confirmed  given. I began by making an incision in the left upper quadrant midclavicular  line at Gresham's point. A 0-degree 5-mm laparoscope was used to enter  the abdominal cavity in a direct Optiview fashion. The abdomen was  entered without complication and insufflated to 15 mmHg. Additional  ports were then placed including a supraumbilical 5-mm port, suprapubic  5-mm port, and left lower quadrant 5-mm port. The patient was placed in  head down, in slight right side up positioning. We did note the tattoo  was in the proximal transverse hook, and he had had an average size  ascending colon, so we decided to go ahead and proceed with a  laparoscopic extended right colectomy. Ileocolic pedicle was placed on  stretch, and a peritoneal incision was then made underneath the  ileocolic pedicle. Then, I skeletonized the pedicle and dissected away  the duodenum out of the retroperitoneal structures. High ligation was  performed of the ileocolic pedicle. I then continued medial to  laterally and inferiorly and superiorly  the retroperitoneal  structures away from mesocolic structures. I then went to the mid  transverse colon and  the omentum from the mid transverse  colon.   I noted a lot of tattoo was in this area and actually made the dissection little bit difficult due to the post endoscopy tattoo  scarring. I ended up dissecting the entirety of the omentum off the  transverse colon and entered the previous medial to lateral dissection  plane, continuing along the hepatic flexure, ascending, and cecum  including the appendix and the terminal ileum. I then took additional  mesentery of the terminal ileum to incorporate the last 3 to 4 cm of the  terminal ileum. At this point, I noted that the colon was quite mobile. I then performed an extraction incision by extending the supraumbilical  port site, about 2.5 x 3 inches total using 15-blade scalpel. I then  dissected through the subcutaneous tissue down to the fascia. The  fascia was incised in a vertical fashion. A medium-sized wound  protector was then placed. Specimen was then extracted through the  wound protector site. I noted the terminal ileum and took down  remaining mesentery using LigaSure device. I used a HEYDI-75 blue load  stapler to transect this area. I then was able to palpate the tumor in  the proximal transverse colon, chose an area at least 5 to 6 cm distal  to this and circumferentially dissected around the colon. The remaining  mesentery including right branch of the middle colic was taken down  using LigaSure device with good hemostasis, and HEYDI-75 blue load stapler  was used to transect the transverse colon, completely  the  specimen. It was then passed off to the back table. I did open it  later in the operation confirming the entirety of the mass was contained  in the specimen. I then setup for anastomosis. The corner of each  staple line was then excised. He was noted to have a lot of prep still  in the small bowel, so this was suctioned out using a protected suction  tip. There was no spillage in the abdominal cavity thankfully. HEYDI-75  blue load stapler was used to pass it on either end.   Antimesenteric borders were then lined up. Staples were then fired creating a staple  side-to-side functional and end-to-end ileotransverse anastomosis. The  stapler was then removed, and then common enterotomy was then grasped  using Allis graspers and closed using additional HEYDI-75 blue load  stapler. There was a fair amount of oozing from the staple line, so a  running 3-0 Vicryl was then used to reinforce the entirety of the  transverse staple line. Two apex sutures were placed as well using 2-0  Vicryl sutures. The anastomosis was then inspected. It was completely  intact, widely patent, hemostatic, well perfused, and no evidence of  twisting whatsoever. It was placed back in the abdominal cavity, and  the entirety of the team then changed gloves and gown as necessary. 0  PDS suture x2 was then used to close the fascia. I went back in  laparoscopically after insufflating the abdomen and confirmed complete  fascial closure, and we noted a good lay of our anastomosis. The  omentum was then placed over the anastomosis. The laparoscopic ports  were then removed under direct visualization, and then the abdomen was  desufflated. 4-0 Monocryl and skin glue were used to close the  laparoscopic incisions. The extraction site was then thoroughly  irrigated and closed using 3-0 Vicryl, 4-0 Monocryl, and skin glue. The patient tolerated the procedure well. He was extubated and brought  to PACU in stable condition. A TAP block will be performed by  Anesthesiology. The patient's brother was updated on the operative  findings.         Tatianna Maaz MD    D: 02/15/2021 12:42:34       T: 02/15/2021 14:30:17     [email or web address]_[email or web address]  Job#: [id number]     Doc#: [id number]    CC:

## 2021-02-16 NOTE — CARE COORDINATION
YOLIS spoke with Surgery team this AM. They reported patient can discharge back to [facility] today after planned procedure. YOLIS sent referral I Alfred.      Sumi Powers, AKILAH, [facility]  Social Work/Case Management  [facility]   [phone]
Applicable    IMM Completed:   Not Indicated    Transportation:  Transportation PLAN for discharge: EMS transportation   Mode of Transport: Ambulance stretcher - BLS  Reason for medical transport: Bed confined: Meets the following criteria 1) unable to get out of bed without assistance or ambulate, 2) unable to safely sit up in a wheelchair, 3) unable to maintain erect seating position in a chair for time needed for transport  Name of Transport Company: [employer]  Phone: [phone]  Time of Transport: 2pm    Transport form completed: Yes        Additional CM Notes: Pt from [facility] [employer] will return today at 2 pm via first care [phone], nurse to call report to [phone] orders faxed to [phone], Katheryn White aware of DC today at 2pm    The Plan for Transition of Care is related to the following treatment goals of Adenoma of transverse colon [D12.3]    The Patient and/or patient representative Louise Lara and his family were provided with a choice of provider and agrees with the discharge plan Yes    Freedom of choice list was provided with basic dialogue that supports the patient's individualized plan of care/goals and shares the quality data associated with the providers.  Yes    Care Transitions patient: No    Jayme Kaplan RN  [facility]  Case Management Department  Ph: [phone]  Fax: [phone]

## 2021-02-16 NOTE — PLAN OF CARE
Problem: Falls - Risk of:  Goal: Will remain free from falls  Description: Will remain free from falls  Outcome: Ongoing  Note: Patient remains free from physical injury. Fall precautions in place: Patient in bed lowest position,wheels locked. 2/4 side rails up Call light and beside table within reach. Will continue to monitor       Problem: Pain:  Goal: Pain level will decrease  Description: Pain level will decrease  Outcome: Ongoing  Note: Patient denies any pain. Tylenol given as scheduled. Will continue to monitor     Problem: Infection - Surgical Site:  Goal: Will show no infection signs and symptoms  Description: Will show no infection signs and symptoms  Outcome: Ongoing  Note: CDI surgical sites WILMA. No signs of infection noted.  Will continue to monitor

## 2021-02-16 NOTE — DISCHARGE INSTR - DIET

## 2021-02-16 NOTE — PLAN OF CARE
Problem: Falls - Risk of:  Goal: Will remain free from falls  Description: Will remain free from falls  2/16/2021 1143 by Nadine Calix RN  Outcome: Met This Shift  Note: Pt has remained free of falls, as Pt is a fall risk. Bed/chair alarm remains armed and bed is in lowest position. Pt's room door remains open, and call light is within reach. Pt has on non-skid socks and calls out appropriately       Problem: ELIMINATION  Goal: Elimination patterns are normal or improving  Description: Elimination patterns return to pre-surgery normal patterns  Outcome: Met This Shift  Note: Pt had 1 BM this morning and has voided this morning since mejia catheter was removed.  MD notified

## 2021-02-16 NOTE — PROGRESS NOTES
4 Eyes Admission Assessment     I agree as the admission nurse that 2 RN's have performed a thorough Head to Toe Skin Assessment on the patient. ALL assessment sites listed below have been assessed on admission. Areas assessed by both nurses:   [x]   Head, Face, and Ears   [x]   Shoulders, Back, and Chest  [x]   Arms, Elbows, and Hands   [x]   Coccyx, Sacrum, and Ischium  [x]   Legs, Feet, and Heels        Does the Patient have Skin Breakdown?   Yes a wound was noted on the Admission Assessment and an LDA was Initiated documentation include the Alice-wound, Wound Assessment, Measurements, Dressing Treatment, Drainage, and Color\",     Red areas bilateral knees, right FA  LLE toe scabs x2         Kana Prevention initiated:  Yes   Wound Care Orders initiated:  NA      [facility] nurse consulted for Pressure Injury (Stage 3,4, Unstageable, DTI, NWPT, and Complex wounds) or Kana score 18 or lower:  NA      Nurse 1 eSignature: Electronically signed by Ana Doherty on 2/15/21 at 4:48 PM EST    **SHARE this note so that the co-signing nurse is able to place an eSignature**    Nurse 2 eSignature: Electronically signed by Debbie Leary RN on 2/15/21 at 5:42 PM EST
General Surgery Daily Progress Note      CC: Extended right colectomy    Subjective :  No acute events overnight. Pt reports pain is well controlled, tolerating diet well. Has not had a BM. Denies any fever or chills, no nausea or vomiting. Has not been ambulating, was encouraged to do so. Objective     Exam:  Vitals:    02/15/21 1509 02/15/21 1937 02/15/21 2345 02/16/21 0320   BP: (!) 154/87 (!) 142/84 122/65 116/65   Pulse: 79 92 76 75   Resp: 16 18 16 18   Temp: 97.9 °F (36.6 °C) 99.1 °F (37.3 °C) 98.5 °F (36.9 °C) 98.1 °F (36.7 °C)   TempSrc: Oral Oral Oral Oral   SpO2: 97% 91% 94% 95%   Weight:       Height:           General appearance: alert, no acute distress, grooming appropriate  Chest/Lungs: CTAB, no crackles/rales, wheezes/rhonchi, normal effort  Cardiovascular: RRR, no murmurs/gallops/rubs  Abdomen: soft, appropriately-tender, non-distended, no guarding/rigidity, surgical incisions c/d/i with surgical glue without surrounding erythema or drainage  Extremities: no edema, no cyanosis  Neuro: A&Ox3, no focal deficits, sensation intact      ASSESSMENT/PLAN:   This is a 72 y.o. male  s/p laparoscopic extended right colectomy on 02/15/21, POD #1.    - Pain under control, continue current regimen  - Tolerating diet and liquids- SLIV  - Encourage frequent ambulation and out of bed to chair  - Gonsalez removed at 6:30am, patient remains a void check by 1430  - Necessary home meds restarted  - Possible d/c today       Catherine Mcclendonportia- MS3  02/16/21  5:58 AM     Addendum:  Patient seen and examined with Medical Student and Surgical Team.  Agree with assessment and plan with changes made accordingly.     Alexa Bernardo DO, Shon Messing  PGY-1 General Surgery  02/16/21  6:58 AM  [phone]
NURSING HOME / GROUP HOME SURGICAL/ PROCEDURE PATIENTS:  Facility Name:[facility]  Phone #:  [phone]  Fax #: [phone]  Nurse spoke with: Quintin Silva  Must have a PCR Covid test within 6 days of surgery/ procedure. Date to be done 2/10/21  Can patient sign for themselves? Yes   If no, POA name: Lukas Madera   phone #:  [phone]  Instructed to fax Advance directive/ POA forms / Living will paperwork: Yes   Is patient able to stand on own: Yes   Assistive devices needed: walker  Incontinent: No   Skin issues? No  Transportation: Unknown at this time thru [facility]  Recent infection: No  if yes, describe    Blood product refusal: No  Able to read / write: Yes  Behavior issues: No If yes, describe OCD    AICD & / or Pacemaker: No    Blood thinners- Last day patient to take per surgeon orders: ASA 81 mg last dose 2/10/21    Instructions:  Please fax patient's Med list, Dx list, and POA paper work ASAP  Please call for orders from surgeon or PCP re: diabetic medications / blood thinners if not already received  Meds to take day of surgery: Xanax if needed  Please fax H&P, labs, EKG, & Covid results as soon as completed  Instruct no food / drink 8 hours prior to arrival (except sip water with meds only)  Dress loose comfortable clothing.   No lotions, powders, nail polish, hairclips or jewelry  Send CPAP if uses  Shower am of surgery with antibacterial soap (or hibiclens, if ordered)
OR Nurse made aware of contact with pt's Nurse at [facility] concerning prep results.
PACU Transfer to Floor Note    Current Allergies: Patient has no known allergies. Pt meets criteria as per Janay Score and ASPAN Standards to transfer to next phase of care. Recent Labs     02/15/21  0847   POCGLU 97       Vitals:    02/15/21 1455   BP: 155/83   Pulse: 73   Resp: 18   Temp: 97.0   SpO2: 99%     Vitals within 20% of pt's admission vitals as per JANAY SCORE    SpO2: 99 %    O2 Flow Rate (L/min): 2 L/min      Intake/Output Summary (Last 24 hours) at 2/15/2021 1458  Last data filed at 2/15/2021 1455  Gross per 24 hour   Intake 1850 ml   Output 1125 ml   Net 725 ml       Pain assessment:  none    Pain Level: 0    Patient was assessed for alterations to skin integrity. There were alterations observed. Is patient incontinent: no has mejia to gravity    Handoff report given at bedside.  To Kiley RN 5 [facility] updated and directed to pt room call to [facility] gave room number to    Personal items and wheelchair took to floor by transportation       2/15/2021 2:58 PM
Patient alert and oriented. VSS Patient denies any pain. Tylenol given as scheduled. CDI surgical sites. Gonsalez in place draining yellow urine. Patient in bed lowest position call light and bedside table within reach. All needs are met at this time. Patient aware to call if any help needed. Will continue to monitor  /65   Pulse 75   Temp 98.1 °F (36.7 °C) (Oral)   Resp 18   Ht 5' 9\" (1.753 m)   Wt 197 lb (89.4 kg)   SpO2 95%   BMI 29.09 kg/m²
Pt admitted to room 5323, VSS. Denies pain or nausea. States he's \"just tired\". Resting with eyes closed but easy to arouse. Gonsalez with clear yellow urine. Brother is at bedside, all questions answered. Will review POC and instruct on I. S. when pt more alert. Pt now resting with eyes closed, bed alarm on, call light within reach.      BP (!) 154/87   Pulse 79   Temp 97.9 °F (36.6 °C) (Oral)   Resp 16   Ht 5' 9\" (1.753 m)   Wt 197 lb (89.4 kg)   SpO2 97%   BMI 29.09 kg/m²
Pt arrived sleepy answers to name denies need for pain interventions and denies pain report from anesthesia Gonsalez now and in procedure ,100 blood lose and 800 of urine
Pt jackie removed @0612.
Pt lives at [facility], pt unsure of ability to quarantine.
Pt remained A&Ox4 and VSS. Surgical sites remained clean, dry, intact. Pt remained voiding freely and had BM this AM. Pt ambulated to restroom with front wheel walker frequently during shift. Discharge order acknowledged, and IV removed and report called to [facility].  Pt was safely transported off unit by EMS transport team and discharged around [address]    Signed Kelsey Weaver, RN 2:23 PM
Pt was able to freely void a significant amount of urine in toilet this AM. Pt reported that he was unable to use urinal. Pt had BM as well, see I&Os
Spoke with Warren Berry, nurse supervisor, at [facility]. Asked her to please fax us H&P and any testing done.
[facility] [employer] [facility] [facility] ([facility]) Juan Ramon Thompson, [address]    Acknowledgment of Informed Consent for Surgical or Medical Procedure and Sedation  I agree to allow doctor(s) Sammi García and his/her associates or assistants, including residents and/or other qualified medical practitioner to perform the following medical treatment or procedure and to administer or direct the administration of sedation as necessary:  Procedure(s): [address]  My doctor has explained the following regarding the proposed procedure:   the explanation of the procedure   the benefits of the procedure   the potential problems that might occur during recuperation   the risks and side effects of the procedure which could include but are not limited to severe blood loss, infection, stroke or death   the benefits, risks and side effect of alternative procedures including the consequences of declining this procedure or any alternative procedures   the likelihood of achieving satisfactory results. I acknowledge no guarantee or assurance has been made to me regarding the results. I understand that during the course of this treatment/procedure, unforeseen conditions can occur which require an additional or different procedure. I agree to allow my physician or assistants to perform such extension of the original procedure as they may find necessary. I understand that sedation will often result in temporary impairment of memory and fine motor skills and that sedation can occasionally progress to a state of deep sedation or general anesthesia. I understand the risks of anesthesia for surgery include, but are not limited to, sore throat, hoarseness, injury to face, mouth, or teeth; nausea; headache; injury to blood vessels or nerves; death, brain damage, or paralysis.     I understand that if I have a Limitation of Treatment order in effect during my hospitalization, the order may
output is adequate, Gonsalez to be removed at midnight  Wound: Surgical glue overlying incisions  Ambulation: OOB to chair, encourage ambulation  Prophylaxis: SCDs, Ishaan Chacon.  Mari Jacob MD  PGY1, General Surgery  02/15/21  5:24 PM  [phone]

## 2021-02-17 ENCOUNTER — OUTSIDE SERVICES (OUTPATIENT)
Dept: INTERNAL MEDICINE CLINIC | Age: 66
End: 2021-02-17
Payer: MEDICARE

## 2021-02-17 VITALS
HEART RATE: 89 BPM | RESPIRATION RATE: 20 BRPM | SYSTOLIC BLOOD PRESSURE: 126 MMHG | TEMPERATURE: 97.9 F | DIASTOLIC BLOOD PRESSURE: 66 MMHG | OXYGEN SATURATION: 98 %

## 2021-02-17 DIAGNOSIS — F42.2 MIXED OBSESSIONAL THOUGHTS AND ACTS: ICD-10-CM

## 2021-02-17 DIAGNOSIS — Z90.49 S/P LAPAROSCOPIC COLECTOMY: ICD-10-CM

## 2021-02-17 DIAGNOSIS — F41.1 GENERALIZED ANXIETY DISORDER: ICD-10-CM

## 2021-02-17 DIAGNOSIS — Z86.010 HX OF ADENOMATOUS COLONIC POLYPS: ICD-10-CM

## 2021-02-17 DIAGNOSIS — Z86.718 HISTORY OF DEEP VENOUS THROMBOSIS: ICD-10-CM

## 2021-02-17 DIAGNOSIS — G61.81 CIDP (CHRONIC INFLAMMATORY DEMYELINATING POLYNEUROPATHY) (HCC): ICD-10-CM

## 2021-02-17 DIAGNOSIS — K63.5 DYSPLASTIC COLON POLYP: Primary | ICD-10-CM

## 2021-02-17 PROCEDURE — 99308 SBSQ NF CARE LOW MDM 20: CPT | Performed by: INTERNAL MEDICINE

## 2021-02-17 NOTE — RESULT ENCOUNTER NOTE
Spoke to [address] [facility] brother on the phone. Sounds like he is recovering very well. Discussed pathology - severe dysplasia, but no evidence of malignancy. I will see back in 2 weeks for routine postop check.

## 2021-02-17 NOTE — PROGRESS NOTES
[facility]) Physicians  Internal Medicine  Patient Encounter  Suzie Moore D.O., [facility]        Chief Complaint   Patient presents with    Follow-Up from Hospital       HPI: 72 y.o. male seen S/P hospitalization for Laparoscopic colon resection for large colon polypoid mass. Pt diagnosed with a large adenomatous colon polyp in the transverse colon found during colonoscopy. Pt was found to have a slightly worsening anemia on routine lab and subsequent fecal immunochemical test was positive. He was referred to GI for colonoscopy. This was performed on 2/3/2021 by Dr. Dunia Henry. Several polyps were found. One polyp located in the transverse colon was too large to remove endoscopically. This polypoid mass measured 5 cm. All polyps were adenomatous without dysplasia. Dr. Dunia Henry referred pt to colorectal specialist, Dr. Shazia Bojorquez. Pt was admitted to [facility]. and he was taken to the OR where Dr. Shazia Bojorquez performed a laparoscopic extended right colectomy with ileotransverse anastomosis. Pt did well postoperatively and he was transferred back to [facility] long term care. He had a normal BM at the hospital.      He offers no new concerns other than 3 loose BM's. He denies much pain. He denies fever, chills, sweats. He denies cough, SOB, N/V. He is anxious to be up and around. He does have a H/O DVT and he will be at risk for DVT if immobile.           Past Medical History:   Diagnosis Date    Adenomatous colon polyp 02/03/2021    Multiple    CIDP (chronic inflammatory demyelinating polyneuropathy) (HCC)     Clostridium difficile colitis 2017    Constipation     Deep vein thrombosis (DVT) of left lower extremity ([address] 08/2018    Dementia with behavioral disturbance (HCC)     Depression with anxiety     Difficulty walking     Discitis of cervical region 08/2017    Dyslipidemia     E coli bacteremia 01/24/2020    AMARJIT (generalized anxiety disorder)     Hyperlipidemia     OCD (obsessive compulsive disorder)     Overactive bladder     Primary insomnia     Pulmonary embolism ([facility] 08/2017    Pyelonephritis of right kidney 01/24/2020    with sepsis    Rhabdomyolysis 11/05/2016    Stage 3a chronic kidney disease     Urinary retention 11/05/2016    Vitamin D deficiency          MEDICATIONS:  Medication Sig   oxyCODONE (ROXICODONE) 5 MG immediate release tablet Take 1 tablet by mouth every 6 hours as needed for Pain for up to 3 days. ondansetron (ZOFRAN-ODT) 4 MG disintegrating tablet Take 1 tablet by mouth every 8 hours as needed for Nausea or Vomiting   aspirin 81 MG EC tablet Take 81 mg by mouth daily   calcium carbonate (OYSTER SHELL CALCIUM 500 MG) 1250 (500 Ca) MG tablet Take 1 tablet by mouth 2 times daily   loperamide (IMODIUM) 2 MG capsule Take 2 mg by mouth 4 times daily as needed for Diarrhea   PARoxetine (PAXIL) 20 MG tablet Take 60 mg by mouth every morning 60mg in AM and 20mg in PM   PARoxetine (PAXIL) 20 MG tablet Take 20 mg by mouth nightly   Balsam [place]-Castor Oil (VENELEX) OINT ointment Apply topically every 6 hours as needed (skin irritation)   polyethylene glycol (GLYCOLAX) 17 GM/SCOOP powder Take 17 g by mouth daily as needed   ALPRAZolam (XANAX) 1 MG tablet Take 1 tablet by mouth every 6 hours for 180 days.  .  May take 1 additional tab as needed for anxiety   finasteride (PROSCAR) 5 MG tablet Take 5 mg by mouth daily   melatonin 3 MG TABS tablet Take 6 mg by mouth nightly   pantoprazole (PROTONIX) 20 MG tablet Take 20 mg by mouth daily   pravastatin (PRAVACHOL) 40 MG tablet Take 40 mg by mouth nightly   QUEtiapine (SEROQUEL) 100 MG tablet Take 100 mg by mouth 2 times daily   acetaminophen (TYLENOL) 325 MG tablet Take 650 mg by mouth every 6 hours as needed for Pain   vitamin D (CHOLECALCIFEROL) 25 MCG (1000 UT) TABS tablet Take 1,000 Units by mouth daily   Multiple Vitamins-Minerals (CENTRUM SILVER) TABS Take by mouth daily           Review of Systems - As per HPI  GEN: Pt denies fever, chills or night sweats. Denies weight changes. Appetite good  HEENT: Pt denies visual and auditory changes, epistaxis, upper respiratory symptoms and dysphagia  CV: Pt denies CP, SOB, [place], orthopnea palpitations, LE swelling, and claudication. PULM: Pt denies cough, wheezing or sputum production  GI: Pt denies N/V/D, heart burn, rectal bleeding, or melena. NEURO: Pt denies unilateral weakness, paresthesia and dizziness. OBJECTIVE:  /66   Pulse 89   Temp 97.9 °F (36.6 °C)   Resp 20   SpO2 98%   GEN: NAD, A&O, Non-toxic  HEENT: NC/AT, PARISH, EOMI, Oral cavity Clear,  TM's NL, Nasal cavity clear. NECK: Supple. No thyromegaly. No JVD  LYMPH: No C/SC nodes. CV: S1 S2 NL, RRR. No murmurs, clicks or rubs. PULM: CTA, symmentric air exchange  EXT: No C/C/E  GI: Abdomen is soft, NT, BS+, No hepatomegaly. No masses. Surgical sites C/D/I.   PSYCH:  Repetitive questions. ASSESSMENT[de-identified]  Mady Perez was seen today for follow-up from hospital.    Diagnoses and all orders for this visit:    Dysplastic colon polyp    S/P laparoscopic colectomy    Hx of adenomatous colonic polyps    CIDP (chronic inflammatory demyelinating polyneuropathy) ([facility] [address]    Generalized anxiety disorder    Mixed obsessional thoughts and acts    History of deep venous thrombosis        Additional Plan: 1. Will try to walk pt 3 times daily  2. PT to help with in-room exercise/ankle pumps  3. Regular diet  4. Lab on Monday    Discussed medications with patient who voiced understanding of their use, indication and potential side effects. Pt also understands the above recommendations. All questions answered. This note was generated completely or in part utilizing Dragon dictation speech recognition software. Occasionally, words are mistranscribed and despite editing, the text may contain inaccuracies due to incorrect word recognition.   If further clarification is needed please contact the office at [phone] Electronically signed    Madisyn Holt D.O.

## 2021-02-18 ENCOUNTER — TELEPHONE (OUTPATIENT)
Dept: INTERNAL MEDICINE CLINIC | Age: 66
End: 2021-02-18

## 2021-02-18 NOTE — TELEPHONE ENCOUNTER
[facility] Transitions Initial Follow Up Call    Outreach made within 2 business days of discharge: Yes    Patient: Wilfredo Howard Patient : 1955   MRN: <[id number]>  Reason for Admission: There are no discharge diagnoses documented for the most recent discharge.   Discharge Date: 21       Spoke with: patient had HFU with PCP on     Discharge department/facility: [facility]    Scheduled appointment with PCP within 7-14 days    Follow Up  Future Appointments   Date Time Provider Juan Ramon Mejía   3/3/2021  2:15 PM Parisa Armendariz MD [address] [place], [place]

## 2021-03-03 ENCOUNTER — OFFICE VISIT (OUTPATIENT)
Dept: SURGERY | Age: 66
End: 2021-03-03

## 2021-03-03 VITALS
SYSTOLIC BLOOD PRESSURE: 152 MMHG | DIASTOLIC BLOOD PRESSURE: 84 MMHG | TEMPERATURE: 97.5 F | BODY MASS INDEX: 29.09 KG/M2 | HEIGHT: 69 IN | HEART RATE: 88 BPM

## 2021-03-03 DIAGNOSIS — D12.3 ADENOMA OF TRANSVERSE COLON: Primary | ICD-10-CM

## 2021-03-03 PROCEDURE — 99024 POSTOP FOLLOW-UP VISIT: CPT | Performed by: SURGERY

## 2021-03-03 NOTE — PROGRESS NOTES
[address] [address] [address] [facility]  Dept: [phone]  Dept Fax: [phone]  Loc: [phone]    Visit Date: 3/3/2021    Pedro Kunz is a 72 y.o. male who presents today for: Post-Op Check      Subjective:     Pedro Kunz is a 72 y.o. male here for postoperative visit after lap right colectomy 2 weeks ago. Overall doing well. No complaints. Patient's problem list, medications, past medical, surgical, family, and social histories were reviewed and updated in the chart as indicated today. Objective:     BP (!) 152/84   Pulse 88   Temp 97.5 °F (36.4 °C)   Ht 5' 9\" (1.753 m)   BMI 29.09 kg/m²     Abdominal/wound: Wounds healing very well    Assessment/Plan:       ASSESSMENT/PLAN:    2-week status post lap right colectomy. Final pathology adenomatous tissue with no malignancy. DISPOSITION: Follow-up with me as needed. Next colonoscopy per Dr. Lee Edge, likely 1 to 2 years. Note completed using dictation software, please excuse any errors. Referring/primary care physician updated through [employer] note if PCP was listed.     Electronically signed by Florentin Gaitan MD on 3/3/2021 at 2:50 PM

## 2021-03-28 DIAGNOSIS — F41.1 GENERALIZED ANXIETY DISORDER: ICD-10-CM

## 2021-03-28 RX ORDER — ALPRAZOLAM 1 MG/1
1 TABLET ORAL EVERY 6 HOURS
Qty: 120 TABLET | Refills: 5 | Status: SHIPPED | OUTPATIENT
Start: 2021-03-28 | End: 2021-09-29 | Stop reason: SDUPTHER

## 2021-04-17 ENCOUNTER — OUTSIDE SERVICES (OUTPATIENT)
Dept: INTERNAL MEDICINE CLINIC | Age: 66
End: 2021-04-17
Payer: MEDICARE

## 2021-04-17 VITALS
TEMPERATURE: 97.8 F | WEIGHT: 195 LBS | DIASTOLIC BLOOD PRESSURE: 84 MMHG | RESPIRATION RATE: 18 BRPM | BODY MASS INDEX: 28.8 KG/M2 | HEART RATE: 74 BPM | OXYGEN SATURATION: 95 % | SYSTOLIC BLOOD PRESSURE: 127 MMHG

## 2021-04-17 DIAGNOSIS — N13.8 BPH WITH OBSTRUCTION/LOWER URINARY TRACT SYMPTOMS: ICD-10-CM

## 2021-04-17 DIAGNOSIS — G61.81 CIDP (CHRONIC INFLAMMATORY DEMYELINATING POLYNEUROPATHY) (HCC): ICD-10-CM

## 2021-04-17 DIAGNOSIS — S82.52XA CLOSED AVULSION FRACTURE OF MEDIAL MALLEOLUS OF LEFT TIBIA, INITIAL ENCOUNTER: ICD-10-CM

## 2021-04-17 DIAGNOSIS — R45.1 AGITATION: ICD-10-CM

## 2021-04-17 DIAGNOSIS — S82.832A CLOSED FRACTURE OF DISTAL END OF LEFT FIBULA, UNSPECIFIED FRACTURE MORPHOLOGY, INITIAL ENCOUNTER: Primary | ICD-10-CM

## 2021-04-17 DIAGNOSIS — F03.91 DEMENTIA WITH BEHAVIORAL DISTURBANCE, UNSPECIFIED DEMENTIA TYPE: ICD-10-CM

## 2021-04-17 DIAGNOSIS — Z87.81 S/P ORIF (OPEN REDUCTION INTERNAL FIXATION) FRACTURE: ICD-10-CM

## 2021-04-17 DIAGNOSIS — N18.31 STAGE 3A CHRONIC KIDNEY DISEASE (HCC): ICD-10-CM

## 2021-04-17 DIAGNOSIS — R41.0 CONFUSION: ICD-10-CM

## 2021-04-17 DIAGNOSIS — F41.1 GENERALIZED ANXIETY DISORDER: ICD-10-CM

## 2021-04-17 DIAGNOSIS — Z98.890 S/P ORIF (OPEN REDUCTION INTERNAL FIXATION) FRACTURE: ICD-10-CM

## 2021-04-17 DIAGNOSIS — N40.1 BPH WITH OBSTRUCTION/LOWER URINARY TRACT SYMPTOMS: ICD-10-CM

## 2021-04-17 DIAGNOSIS — M17.12 PRIMARY OSTEOARTHRITIS OF LEFT KNEE: ICD-10-CM

## 2021-04-17 PROCEDURE — 99309 SBSQ NF CARE MODERATE MDM 30: CPT | Performed by: INTERNAL MEDICINE

## 2021-04-17 PROCEDURE — 1111F DSCHRG MED/CURRENT MED MERGE: CPT | Performed by: INTERNAL MEDICINE

## 2021-04-17 RX ORDER — OXYCODONE HYDROCHLORIDE 5 MG/1
5-10 TABLET ORAL EVERY 6 HOURS PRN
COMMUNITY
Start: 2021-04-14 | End: 2021-04-21

## 2021-04-17 RX ORDER — IBUPROFEN 600 MG/1
600 TABLET ORAL EVERY 8 HOURS PRN
COMMUNITY

## 2021-04-17 NOTE — PROGRESS NOTES
nonweightbearing and to keep his splint on at all times. He was to follow-up in 10 to 14 days. The patient was not sent back on any DVT prophylaxis in addition to his regular aspirin. Since he has been back at [facility] his anxiety level has increased. He has had frequent episodes of yelling out. Unfortunately, the patient is impulsive and has been trying to get up on his own and has made his way to the nurses station despite recommendations for full nonweightbearing. Patient is repetitive with comments and behaviors as he has demonstrated in the past associated with his OCD. Patient was demanding to see me this morning. I came right away. Patient offers no complaints of pain, shortness of breath, abdominal pain, nausea, vomiting. Patient is agitated and upset that the night shift nurses/aide were not responsive in a timely manner per his report. He is asking for help to go to the bathroom. He denies any dysuria. Staff report that he is more confused than usual.     Patient has a history of CIDP that has rendered him with chronic lower extremity weakness. 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 He is under the care of psychiatry.        Past Medical History:   Diagnosis Date    Adenomatous colon polyp 02/03/2021    Multiple    CIDP (chronic inflammatory demyelinating polyneuropathy) (HCC)     Clostridium difficile colitis 2017    Constipation     Deep vein thrombosis (DVT) of left lower extremity ([address] 08/2018    Dementia with behavioral disturbance (HCC)     Depression with anxiety     Difficulty walking     Discitis of cervical region 08/2017    Dyslipidemia     E coli bacteremia 01/24/2020    AMARJIT (generalized anxiety disorder)     Hyperlipidemia     OCD (obsessive compulsive disorder)     Overactive bladder     Primary insomnia     Pulmonary embolism ([address] 08/2017    Pyelonephritis of right kidney 01/24/2020    with sepsis    Rhabdomyolysis 11/05/2016    Stage 3a chronic kidney disease     Urinary retention 11/05/2016    Vitamin D deficiency          MEDICATIONS:  Prior to Visit Medications    Medication Sig Taking? Authorizing Provider   ibuprofen (ADVIL;MOTRIN) 600 MG tablet Take 600 mg by mouth every 8 hours as needed for Pain Yes Historical Provider, MD   oxyCODONE (ROXICODONE) 5 MG immediate release tablet Take 5-10 mg by mouth every 6 hours as needed for Pain. Yes Historical Provider, MD   ALPRAZolam Tamera Lin) 1 MG tablet Take 1 tablet by mouth every 6 hours for 180 days.  .  May take 1 additional tab as needed for anxiety Yes Balaji Chase,    aspirin 81 MG EC tablet Take 81 mg by mouth daily Yes Historical Provider, MD   calcium carbonate (OYSTER SHELL CALCIUM 500 MG) 1250 (500 Ca) MG tablet Take 1 tablet by mouth 2 times daily Yes Historical Provider, MD   PARoxetine (PAXIL) 20 MG tablet Take 60 mg by mouth every morning 60mg in AM and 20mg in PM Yes Historical Provider, MD   PARoxetine (PAXIL) 20 MG tablet Take 20 mg by mouth nightly Yes Historical Provider, MD   Balsam [place]-Castor Oil (VENELEX) OINT ointment Apply topically every 6 hours as needed (skin irritation) Yes Historical Provider, MD   polyethylene glycol (GLYCOLAX) 17 GM/SCOOP powder Take 17 g by mouth daily as needed Yes Historical Provider, MD   finasteride (PROSCAR) 5 MG tablet Take 5 mg by mouth daily Yes Historical Provider, MD   melatonin 3 MG TABS tablet Take 6 mg by mouth nightly Yes Historical Provider, MD   pravastatin (PRAVACHOL) 40 MG tablet Take 40 mg by mouth nightly Yes Historical Provider, MD   QUEtiapine (SEROQUEL) 100 MG tablet Take 100 mg by mouth 2 times daily Yes Historical Provider, MD   acetaminophen (TYLENOL) 325 MG tablet Take 650 mg by mouth every 6 hours as needed for Pain Yes Historical Provider, MD   vitamin D (CHOLECALCIFEROL) 25 MCG (1000 UT) TABS tablet Take 1,000 Units by mouth daily Yes Historical Provider, MD   Multiple Vitamins-Minerals (CENTRUM SILVER) TABS Take by mouth daily Yes Historical Provider, MD   ondansetron (ZOFRAN-ODT) 4 MG disintegrating tablet Take 1 tablet by mouth every 8 hours as needed for Nausea or Vomiting  Sarah Ware APRN - CNP   loperamide (IMODIUM) 2 MG capsule Take 2 mg by mouth 4 times daily as needed for Diarrhea  Historical Provider, MD   pantoprazole (PROTONIX) 20 MG tablet Take 20 mg by mouth daily  Historical Provider, MD           Review of Systems - As per HPI      OBJECTIVE:  /84   Pulse 74   Temp 97.8 °F (36.6 °C)   Resp 18   Wt 195 lb (88.5 kg)   SpO2 95%   BMI 28.80 kg/m²   GEN: NAD, Alert. Agitated. Speech is fluent, but thoughts are tangential and frequently out of context. Oriented to year with cues, oriented to day. Disoriented to date, month. HEENT: NC/AT, PARISH, EOMI, Oral cavity Clear,  TM's NL, Nasal cavity clear. NECK: Supple. No thyromegaly. No JVD  LYMPH: No C/SC nodes. CV: S1 S2 NL, RRR. No murmurs, clicks or rubs. PULM: CTA, symmentric air exchange  EXT: No edema. GI: Abdomen is soft, NT, BS+, No hepatomegaly. No masses. NEURO: No focal or lateralizing deficits. VASC:  No carotid bruits. Unable to palpate left pedal pulses. SKIN:  No rashes or lesions of concern  MS:  Left lower extremity in hard splint.  + Ecchymosis seen in great toe. Left knee with no effusion. Crepitus with ROM. ASSESSMENT/PLAN:    1. Closed fracture of distal end of left fibula, unspecified fracture morphology, initial encounter  Essentially a bimalleolar fracture with syndesmosis  Status post ORIF  Continue supportive care  Continue baby aspirin  We will need to monitor closely for development of DVT. Patient does have a history of DVT and PE in the setting of severe sepsis and illness  Patient is to remain nonweightbearing.   This was reviewed with the patient several times until he was repeating it himself. 2. Closed avulsion fracture of medial malleolus of left tibia, initial encounter  As above    3. Confusion  Likely multifactorial including underlying dementia, recent hospitalization, recent surgery anesthesia  Rule out UTI and dehydration    4. Agitation  As above    5. Primary osteoarthritis of left knee  Seen on x-ray but does not seem to be symptomatic at this time    6. CIDP (chronic inflammatory demyelinating polyneuropathy) ([facility])  This is a chronic problem contributing to his lower extremity weakness and cognitive impairment. 7. Dementia with behavioral disturbance, unspecified dementia type ([facility]  Condition under the care of Dr. Megan Carter current medications  ? Acetylcholinesterase inhibitor/memantine. Not sure of the benefit    8. Stage 3a chronic kidney disease  Given his increased confusion and agitation, rule out dehydration and ZULEIKA    9. BPH with obstruction/lower urinary tract symptoms  Monitor renal function. He has a history of urinary retention    10. Dentalized anxiety  Patient has increased anxiety today  Continue current medications            Discussed medications with patient who voiced understanding of their use, indication and potential side effects. Pt also understands the above recommendations. All questions answered. This note was generated completely or in part utilizing Dragon dictation speech recognition software. Occasionally, words are mistranscribed and despite editing, the text may contain inaccuracies due to incorrect word recognition.   If further clarification is needed please contact the office at [phone]       Electronically signed    Lemuel Mcfarland D.O.

## 2021-07-05 DIAGNOSIS — C18.4 MALIGNANT NEOPLASM OF TRANSVERSE COLON (HCC): ICD-10-CM

## 2021-09-29 DIAGNOSIS — F41.1 GENERALIZED ANXIETY DISORDER: ICD-10-CM

## 2021-09-29 RX ORDER — ALPRAZOLAM 1 MG/1
1 TABLET ORAL EVERY 6 HOURS
Qty: 120 TABLET | Refills: 5 | Status: SHIPPED | OUTPATIENT
Start: 2021-09-29 | End: 2022-04-23 | Stop reason: SDUPTHER

## 2021-11-14 PROBLEM — E66.811 CLASS 1 OBESITY DUE TO EXCESS CALORIES WITHOUT SERIOUS COMORBIDITY WITH BODY MASS INDEX (BMI) OF 30.0 TO 30.9 IN ADULT: Status: ACTIVE | Noted: 2021-11-14

## 2021-11-14 PROBLEM — E66.811 CLASS 1 OBESITY DUE TO EXCESS CALORIES WITHOUT SERIOUS COMORBIDITY WITH BODY MASS INDEX (BMI) OF 30.0 TO 30.9 IN ADULT: Status: RESOLVED | Noted: 2021-11-14 | Resolved: 2021-11-14

## 2021-11-14 PROBLEM — E66.09 CLASS 1 OBESITY DUE TO EXCESS CALORIES WITHOUT SERIOUS COMORBIDITY WITH BODY MASS INDEX (BMI) OF 30.0 TO 30.9 IN ADULT: Status: RESOLVED | Noted: 2021-11-14 | Resolved: 2021-11-14

## 2021-11-14 PROBLEM — R19.5 POSITIVE FIT (FECAL IMMUNOCHEMICAL TEST): Status: RESOLVED | Noted: 2021-01-03 | Resolved: 2021-11-14

## 2021-11-14 PROBLEM — E66.09 CLASS 1 OBESITY DUE TO EXCESS CALORIES WITHOUT SERIOUS COMORBIDITY WITH BODY MASS INDEX (BMI) OF 30.0 TO 30.9 IN ADULT: Status: ACTIVE | Noted: 2021-11-14

## 2021-11-14 PROBLEM — R73.02 IGT (IMPAIRED GLUCOSE TOLERANCE): Status: ACTIVE | Noted: 2021-11-14

## 2021-11-16 LAB
ALBUMIN SERPL-MCNC: 3.7 G/DL
ALP BLD-CCNC: 49 U/L
ALT SERPL-CCNC: 28 U/L
ANION GAP SERPL CALCULATED.3IONS-SCNC: NORMAL MMOL/L
AST SERPL-CCNC: 26 U/L
AVERAGE GLUCOSE: 123
BASOPHILS ABSOLUTE: NORMAL
BASOPHILS RELATIVE PERCENT: 0.6 %
BILIRUB SERPL-MCNC: 0.5 MG/DL (ref 0.1–1.4)
BUN BLDV-MCNC: 23 MG/DL
CALCIUM SERPL-MCNC: 9.5 MG/DL
CHLORIDE BLD-SCNC: 105 MMOL/L
CHOLESTEROL, TOTAL: 187 MG/DL
CHOLESTEROL/HDL RATIO: 2.8
CO2: 26 MMOL/L
CREAT SERPL-MCNC: 1.3 MG/DL
EOSINOPHILS ABSOLUTE: 0.3 /ΜL
EOSINOPHILS RELATIVE PERCENT: 3.7 %
GFR CALCULATED: NORMAL
GLUCOSE BLD-MCNC: 109 MG/DL
HBA1C MFR BLD: 5.9 %
HCT VFR BLD CALC: 42.8 % (ref 41–53)
HDLC SERPL-MCNC: 35 MG/DL (ref 35–70)
HEMOGLOBIN: 14.3 G/DL (ref 13.5–17.5)
LDL CHOLESTEROL CALCULATED: 96 MG/DL (ref 0–160)
LYMPHOCYTES ABSOLUTE: 2.1 /ΜL
LYMPHOCYTES RELATIVE PERCENT: 26.6 %
MAGNESIUM: 1.9 MG/DL
MCH RBC QN AUTO: 30.6 PG
MCHC RBC AUTO-ENTMCNC: 33.4 G/DL
MCV RBC AUTO: 91.6 FL
MONOCYTES ABSOLUTE: 0.9 /ΜL
MONOCYTES RELATIVE PERCENT: 11 %
NEUTROPHILS ABSOLUTE: 4.7 /ΜL
NEUTROPHILS RELATIVE PERCENT: 58.1 %
NONHDLC SERPL-MCNC: NORMAL MG/DL
PLATELET # BLD: 270 K/ΜL
PMV BLD AUTO: 9.3 FL
POTASSIUM SERPL-SCNC: 4.2 MMOL/L
RBC # BLD: 4.67 10^6/ΜL
SODIUM BLD-SCNC: 140 MMOL/L
TOTAL PROTEIN: 6.3
TRIGL SERPL-MCNC: 278 MG/DL
VLDLC SERPL CALC-MCNC: 56 MG/DL
WBC # BLD: 8.1 10^3/ML

## 2022-01-23 PROBLEM — R73.01 IMPAIRED FASTING GLUCOSE: Status: ACTIVE | Noted: 2022-01-23

## 2022-04-23 DIAGNOSIS — F41.1 GENERALIZED ANXIETY DISORDER: ICD-10-CM

## 2022-04-23 RX ORDER — ALPRAZOLAM 1 MG/1
1 TABLET ORAL EVERY 6 HOURS
Qty: 120 TABLET | Refills: 5 | Status: SHIPPED | OUTPATIENT
Start: 2022-04-23 | End: 2022-10-20

## 2022-12-28 DIAGNOSIS — F41.1 GENERALIZED ANXIETY DISORDER: ICD-10-CM

## 2022-12-28 RX ORDER — ALPRAZOLAM 1 MG/1
1 TABLET ORAL EVERY 6 HOURS
Qty: 120 TABLET | Refills: 5 | Status: SHIPPED | OUTPATIENT
Start: 2022-12-28 | End: 2023-06-26

## 2023-02-06 DIAGNOSIS — F41.1 GENERALIZED ANXIETY DISORDER: ICD-10-CM

## 2023-04-09 PROBLEM — E78.2 MIXED HYPERLIPIDEMIA: Status: ACTIVE | Noted: 2023-04-09

## 2023-04-09 PROBLEM — K63.5 DYSPLASTIC COLON POLYP: Status: ACTIVE | Noted: 2023-04-09

## 2023-04-09 PROBLEM — D12.6 ADENOMATOUS COLON POLYP: Status: ACTIVE | Noted: 2021-02-03

## 2023-06-22 DIAGNOSIS — F41.1 GENERALIZED ANXIETY DISORDER: ICD-10-CM

## 2023-06-22 RX ORDER — ALPRAZOLAM 1 MG/1
1 TABLET ORAL EVERY 6 HOURS
Qty: 120 TABLET | Refills: 5 | Status: SHIPPED | OUTPATIENT
Start: 2023-06-22 | End: 2023-12-19

## 2023-06-27 ENCOUNTER — TELEPHONE (OUTPATIENT)
Dept: ADMINISTRATIVE | Age: 68
End: 2023-06-27

## 2023-08-07 DIAGNOSIS — F41.1 GENERALIZED ANXIETY DISORDER: ICD-10-CM

## 2023-08-07 RX ORDER — ALPRAZOLAM 1 MG/1
1 TABLET ORAL
Qty: 120 TABLET | Refills: 5 | Status: SHIPPED | OUTPATIENT
Start: 2023-08-07 | End: 2024-02-03

## 2023-08-31 RX ORDER — SODIUM CHLORIDE, SODIUM LACTATE, POTASSIUM CHLORIDE, CALCIUM CHLORIDE 600; 310; 30; 20 MG/100ML; MG/100ML; MG/100ML; MG/100ML
INJECTION, SOLUTION INTRAVENOUS CONTINUOUS
Status: CANCELLED | OUTPATIENT
Start: 2023-08-31

## 2023-09-01 ENCOUNTER — ANESTHESIA (OUTPATIENT)
Dept: ENDOSCOPY | Age: 68
End: 2023-09-01
Payer: MEDICARE

## 2023-09-01 ENCOUNTER — HOSPITAL ENCOUNTER (OUTPATIENT)
Age: 68
Setting detail: OUTPATIENT SURGERY
Discharge: HOME | End: 2023-09-01
Attending: INTERNAL MEDICINE | Admitting: INTERNAL MEDICINE
Payer: MEDICARE

## 2023-09-01 ENCOUNTER — ANESTHESIA EVENT (OUTPATIENT)
Dept: ENDOSCOPY | Age: 68
End: 2023-09-01
Payer: MEDICARE

## 2023-09-01 VITALS
HEIGHT: 69 IN | SYSTOLIC BLOOD PRESSURE: 139 MMHG | DIASTOLIC BLOOD PRESSURE: 76 MMHG | TEMPERATURE: 97.6 F | WEIGHT: 200 LBS | BODY MASS INDEX: 29.62 KG/M2 | OXYGEN SATURATION: 98 % | HEART RATE: 72 BPM | RESPIRATION RATE: 18 BRPM

## 2023-09-01 PROCEDURE — 2580000003 HC RX 258: Performed by: NURSE ANESTHETIST, CERTIFIED REGISTERED

## 2023-09-01 PROCEDURE — 3700000001 HC ADD 15 MINUTES (ANESTHESIA): Performed by: INTERNAL MEDICINE

## 2023-09-01 PROCEDURE — 3700000000 HC ANESTHESIA ATTENDED CARE: Performed by: INTERNAL MEDICINE

## 2023-09-01 PROCEDURE — 3609027000 HC COLONOSCOPY: Performed by: INTERNAL MEDICINE

## 2023-09-01 PROCEDURE — 7100000010 HC PHASE II RECOVERY - FIRST 15 MIN: Performed by: INTERNAL MEDICINE

## 2023-09-01 PROCEDURE — 2709999900 HC NON-CHARGEABLE SUPPLY: Performed by: INTERNAL MEDICINE

## 2023-09-01 PROCEDURE — 7100000011 HC PHASE II RECOVERY - ADDTL 15 MIN: Performed by: INTERNAL MEDICINE

## 2023-09-01 RX ORDER — SODIUM CHLORIDE, SODIUM LACTATE, POTASSIUM CHLORIDE, CALCIUM CHLORIDE 600; 310; 30; 20 MG/100ML; MG/100ML; MG/100ML; MG/100ML
INJECTION, SOLUTION INTRAVENOUS CONTINUOUS PRN
Status: DISCONTINUED | OUTPATIENT
Start: 2023-09-01 | End: 2023-09-01 | Stop reason: SDUPTHER

## 2023-09-01 RX ORDER — SERTRALINE HYDROCHLORIDE 100 MG/1
125 TABLET, FILM COATED ORAL DAILY
COMMUNITY

## 2023-09-01 RX ADMIN — SODIUM CHLORIDE, SODIUM LACTATE, POTASSIUM CHLORIDE, AND CALCIUM CHLORIDE: .6; .31; .03; .02 INJECTION, SOLUTION INTRAVENOUS at 09:48

## 2023-09-01 ASSESSMENT — PAIN SCALES - GENERAL: PAINLEVEL_OUTOF10: 0

## 2023-09-01 ASSESSMENT — PAIN - FUNCTIONAL ASSESSMENT: PAIN_FUNCTIONAL_ASSESSMENT: 0-10

## 2023-09-01 NOTE — FLOWSHEET NOTE
Ambulatory Surgery/Procedure Discharge Note    Vitals:    09/01/23 1300   BP:    Pulse:    Resp:    Temp:    SpO2: 98%   1241-/76, P 72  Pt. Meets discharge criteria per Castro Score    In: 200 [I.V.:200]  Out: -     Restroom use offered before discharge. Yes    Pain assessment:  level of pain (1-10, 10 severe)  Pain Level: 0      Pt and S.O./family states \"ready to go home\". Pt alert and oriented x4. IV removed. Denies N/V, taking fluids without difficulties. Discharge instructions given to pt and brother with pt permission. Pt and brother verbalized understanding of all instructions. Left with all belongings and discharge instructions. Patient discharged to home/self care.  Patient discharged via wheel chair by nurseto waiting family/S.O.       9/1/2023 1:15 PM

## 2023-09-01 NOTE — PROCEDURES
[facility]  Colonoscopy Note    Patient: Mabel Zhong  : 1955  Acct#:     Procedure: Colonoscopy     Date:  2023    Surgeon:  Kathryn Cedeño MD    Referring Physician:  Kathryn Cedeño MD    Anesthesia: IV propofol, per anesthesia    EBL: <50 mL    Indications: personal history of advanced colon polyps (tubulovillous adenoma with high grade dysplasia)    Procedure: An informed consent was obtained from the patient after explanation of indications, benefits, possible risks and complications of the procedure. The patient was then taken to the endoscopy suite, placed in the left lateral decubitus position, and the above IV anesthesia was administered. A digital rectal examination was performed and revealed negative without mass, lesions or tenderness. The Olympus PCFQ-H190 video colonoscope was placed in the patient's rectum under digital direction and advanced to the ileocolonic anastomosis the prep was adequate. Findings:  Ileocolonic anastomosis consistent with previous extended right hemicolectomy. Exam was otherwise normal      The scope was then withdrawn into the rectum and retroflexed. The retroflexed view of the anal verge and rectum demonstrates no hemorrhoids. The scope was straightened, the colon was decompressed and the scope was withdrawn from the patient. The patient tolerated the procedure well and was taken to the PACU in good condition. Biopsies:  no       Impression:   Ileocolonic anastomosis consistent with previous extended right hemicolectomy.   Exam was otherwise normal    Recommendations:  Colonoscopy in 5 years for surveillance    Kathryn Cedeño MD  [facility]

## 2023-09-01 NOTE — H&P
Gastroenterology Note                 Pre-operative History and Physical    Patient: Dex Jenkins  : 1955  CSN:     History Obtained From:   Patient or guardian. HISTORY OF PRESENT ILLNESS:    The patient is a 79 y.o. male here for colonoscopy for pHx colon polyps. Colon  with very large TVA not removable >>surgical rsxn >>R zeyad with extended to trans. TVA with HGD. Past Medical History:    Past Medical History:   Diagnosis Date    Adenomatous colon polyp 2021    Multiple    CIDP (chronic inflammatory demyelinating polyneuropathy) (HCC)     Clostridium difficile colitis     Constipation     Deep vein thrombosis (DVT) of left lower extremity ([address]) 2018    Dementia with behavioral disturbance ([address])     Depression with anxiety     Difficulty walking     Discitis of cervical region 2017    Dyslipidemia     E coli bacteremia 2020    AMARJIT (generalized anxiety disorder)     Hyperlipidemia     OCD (obsessive compulsive disorder)     Overactive bladder     Primary insomnia     Pulmonary embolism ([address]) 2017    Pyelonephritis of right kidney 2020    with sepsis    Rhabdomyolysis 2016    Stage 3a chronic kidney disease ([address])     Urinary retention 2016    Vitamin D deficiency      Past Surgical History:    Past Surgical History:   Procedure Laterality Date    CERVICAL FUSION  2017    C3-T1 Laminectomy with fusion    COLONOSCOPY N/A 2/3/2021    COLONOSCOPY POLYPECTOMY SNARE/COLD BIOPSY performed by Katharine Kate MD at [address] N/A 2/15/2021    LAPAROSCOPIC  PARTIAL COLECTOMY performed by Genesis Daniel MD at [address] [facility]      R side    OTHER SURGICAL HISTORY  11/10/2016    CYSTOSCOPY     Medications Prior to Admission:   No current facility-administered medications on file prior to encounter.      Current Outpatient Medications on File Prior to Encounter   Medication Sig Dispense Refill    sertraline (ZOLOFT)

## 2023-09-01 NOTE — ANESTHESIA PRE PROCEDURE
ROS comment: CIDP (chronic inflammatory demyelinating polyneuropathy) ([address]  Dementia with behavioral disturbance  Difficulty walking GI/Hepatic/Renal:   (+) GERD:, renal disease: CRI,          ROS comment: BPH. Endo/Other: Negative Endo/Other ROS                    Abdominal:             Vascular: negative vascular ROS. Other Findings:             Anesthesia Plan      MAC     ASA 3     (Agrees to block if needed )  Induction: intravenous. Anesthetic plan and risks discussed with patient. Plan discussed with CRNA.                     Courtney Frankel MD   9/1/2023

## 2023-09-01 NOTE — ANESTHESIA POSTPROCEDURE EVALUATION
Department of Anesthesiology  Postprocedure Note    Patient: Desiree Patten  MRN: [id number]  YOB: 1955  Date of evaluation: 9/1/2023      Procedure Summary     Date: 09/01/23 Room / Location: [address] [place] [address] [address] [facility] [address] [facility]    Anesthesia Start: 3478 Anesthesia Stop: 8391    Procedure: COLONOSCOPY Diagnosis:       History of colon polyps      Colonic mass      (History of colon polyps [Z86.010])      (Colonic mass [K63.89])    Surgeons: Uzma Dominguez MD Responsible Provider: Gayle Rivas MD    Anesthesia Type: MAC ASA Status: 3          Anesthesia Type: No value filed.     Castro Phase I: Castro Score: 10    Castro Phase II: Castro Score: 10      Anesthesia Post Evaluation    Patient location during evaluation: PACU  Patient participation: complete - patient participated  Level of consciousness: awake  Pain score: 0  Airway patency: patent  Nausea & Vomiting: no nausea  Complications: no  Cardiovascular status: hemodynamically stable  Respiratory status: acceptable  Hydration status: stable  Pain management: satisfactory to patient

## 2023-12-08 LAB
ESTIMATED AVERAGE GLUCOSE: 166
HBA1C MFR BLD: 7.4 %

## 2024-02-15 PROBLEM — E78.5 DYSLIPIDEMIA: Status: ACTIVE | Noted: 2024-02-15

## 2024-02-15 PROBLEM — E11.9 TYPE 2 DIABETES MELLITUS WITHOUT COMPLICATION, WITHOUT LONG-TERM CURRENT USE OF INSULIN (HCC): Status: ACTIVE | Noted: 2024-02-15

## 2024-05-08 LAB
ALBUMIN SERPL-MCNC: 4.1 G/DL
ALP BLD-CCNC: 41 U/L
ALT SERPL-CCNC: 36 U/L
ANION GAP SERPL CALCULATED.3IONS-SCNC: NORMAL MMOL/L
AST SERPL-CCNC: 23 U/L
BILIRUB SERPL-MCNC: 0.4 MG/DL (ref 0.1–1.4)
BUN BLDV-MCNC: 19 MG/DL
CALCIUM SERPL-MCNC: 9.1 MG/DL
CHLORIDE BLD-SCNC: 107 MMOL/L
CO2: 24 MMOL/L
CREAT SERPL-MCNC: 1.1 MG/DL
EGFR: 67
ESTIMATED AVERAGE GLUCOSE: NORMAL
ESTIMATED AVERAGE GLUCOSE: NORMAL
GLUCOSE BLD-MCNC: 117 MG/DL
HBA1C MFR BLD: 6.7 %
HBA1C MFR BLD: 6.7 %
POTASSIUM SERPL-SCNC: 4.4 MMOL/L
SODIUM BLD-SCNC: 143 MMOL/L
TOTAL PROTEIN: 6.3
TSH SERPL DL<=0.05 MIU/L-ACNC: 1.79 UIU/ML
VITAMIN B-12: 268

## 2024-05-09 LAB
BASOPHILS ABSOLUTE: ABNORMAL
BASOPHILS RELATIVE PERCENT: 0.6 %
EOSINOPHILS ABSOLUTE: 0.2 /ΜL
EOSINOPHILS RELATIVE PERCENT: 2.9 %
HCT VFR BLD CALC: 37.8 % (ref 41–53)
HEMOGLOBIN: 12.9 G/DL (ref 13.5–17.5)
LYMPHOCYTES ABSOLUTE: 1.5 /ΜL
LYMPHOCYTES RELATIVE PERCENT: 21.1 %
MCH RBC QN AUTO: 30.2 PG
MCHC RBC AUTO-ENTMCNC: 34 G/DL
MCV RBC AUTO: 88.8 FL
MONOCYTES ABSOLUTE: 0.6 /ΜL
MONOCYTES RELATIVE PERCENT: 8.8 %
NEUTROPHILS ABSOLUTE: 4.7 /ΜL
NEUTROPHILS RELATIVE PERCENT: 66.6 %
PLATELET # BLD: 200 K/ΜL
PMV BLD AUTO: 9.2 FL
RBC # BLD: 4.26 10^6/ΜL
WBC # BLD: 7.1 10^3/ML

## 2024-06-11 LAB
ESTIMATED AVERAGE GLUCOSE: NORMAL
HBA1C MFR BLD: 6.4 %

## 2024-07-28 DIAGNOSIS — E11.9 TYPE 2 DIABETES MELLITUS WITHOUT COMPLICATION, WITHOUT LONG-TERM CURRENT USE OF INSULIN (HCC): Primary | ICD-10-CM

## 2024-09-10 LAB
ESTIMATED AVERAGE GLUCOSE: 137
HBA1C MFR BLD: 6.4 %

## 2024-10-14 LAB
CREATININE URINE: 151 MG/DL
MICROALBUMIN/CREAT 24H UR: 0.7 MG/DL
MICROALBUMIN/CREAT UR-RTO: 4.6 MG/G

## 2024-12-09 LAB
CHOLESTEROL, TOTAL: 160 MG/DL
CHOLESTEROL/HDL RATIO: 1.6
HDLC SERPL-MCNC: 33 MG/DL (ref 35–70)
LDL CHOLESTEROL: 52
NONHDLC SERPL-MCNC: ABNORMAL MG/DL
TRIGL SERPL-MCNC: 376 MG/DL
VLDLC SERPL CALC-MCNC: 75 MG/DL

## 2025-01-12 DIAGNOSIS — E83.42 HYPOMAGNESEMIA: Primary | ICD-10-CM

## 2025-01-12 PROBLEM — D50.9 MICROCYTIC ANEMIA: Status: RESOLVED | Noted: 2021-01-03 | Resolved: 2025-01-12

## 2025-02-07 DIAGNOSIS — F41.1 GENERALIZED ANXIETY DISORDER: ICD-10-CM

## 2025-02-07 DIAGNOSIS — Z78.9 NURSING HOME RESIDENT: ICD-10-CM

## 2025-02-07 NOTE — TELEPHONE ENCOUNTER
Last appointment: Visit date not found  Next appointment: Visit date not found  Last refill: 08/07/2023

## 2025-02-08 RX ORDER — ALPRAZOLAM 1 MG/1
1 TABLET ORAL
Qty: 120 TABLET | Refills: 5 | Status: SHIPPED | OUTPATIENT
Start: 2025-02-08 | End: 2025-08-07

## 2025-03-10 LAB
CHOLESTEROL, TOTAL: 149 MG/DL
CHOLESTEROL/HDL RATIO: 2
ESTIMATED AVERAGE GLUCOSE: 148
HBA1C MFR BLD: 6.8 %
HDLC SERPL-MCNC: 26 MG/DL (ref 35–70)
LDL CHOLESTEROL: 53
MAGNESIUM: 2.2 MG/DL
NONHDLC SERPL-MCNC: ABNORMAL MG/DL
TRIGL SERPL-MCNC: 351 MG/DL
VLDLC SERPL CALC-MCNC: 70 MG/DL

## 2025-07-01 LAB
CHOLESTEROL, TOTAL: 155 MG/DL
CHOLESTEROL, TOTAL: 155 MG/DL
CHOLESTEROL/HDL RATIO: 1.9
CHOLESTEROL/HDL RATIO: ABNORMAL
HDLC SERPL-MCNC: 32 MG/DL (ref 35–70)
HDLC SERPL-MCNC: 32 MG/DL (ref 35–70)
LDL CHOLESTEROL: 62
LDL CHOLESTEROL: 62
NONHDLC SERPL-MCNC: ABNORMAL MG/DL
NONHDLC SERPL-MCNC: ABNORMAL MG/DL
TRIGL SERPL-MCNC: 307 MG/DL
TRIGL SERPL-MCNC: 307 MG/DL
VLDLC SERPL CALC-MCNC: 61 MG/DL
VLDLC SERPL CALC-MCNC: 61 MG/DL

## 2025-07-02 LAB
ESTIMATED AVERAGE GLUCOSE: 137
HBA1C MFR BLD: 6.4 %

## 2025-07-08 ENCOUNTER — TELEPHONE (OUTPATIENT)
Dept: ADMINISTRATIVE | Age: 70
End: 2025-07-08

## 2025-07-08 NOTE — TELEPHONE ENCOUNTER
Is this patient in [facility]? Please advise.    If this requires a response please respond to the pool. ([facility] Pre-Auth).    Please advise patient thank you.

## 2025-07-15 RX ORDER — QUETIAPINE FUMARATE 100 MG/1
150 TABLET, FILM COATED ORAL 2 TIMES DAILY
Qty: 60 TABLET | Refills: 0 | OUTPATIENT
Start: 2025-07-15

## (undated) DEVICE — SUTURE VCRL SZ 2-0 L18IN ABSRB VLT L26MM SH 1/2 CIR J775D

## (undated) DEVICE — SUTURE PDS II SZ 0 L27IN ABSRB VLT L26MM CT-2 1/2 CIR Z334H

## (undated) DEVICE — CANNULA SAMP CO2 AD GRN 7FT CO2 AND 7FT O2 TBNG UNIV CONN

## (undated) DEVICE — APPLICATOR MEDICATED 26 CC SOLUTION HI LT ORNG CHLORAPREP

## (undated) DEVICE — 40583 XL ADVANCED TRENDELENBURG POSITIONING KIT: Brand: 40583 XL ADVANCED TRENDELENBURG POSITIONING KIT

## (undated) DEVICE — PLATE ES AD W 9FT CRD 2

## (undated) DEVICE — GARMENT,MEDLINE,DVT,INT,CALF,MED, GEN2: Brand: MEDLINE

## (undated) DEVICE — ELECTROSURGICAL PENCIL ROCKER SWITCH NON COATED BLADE ELECTRODE 10 FT (3 M) CORD HOLSTER: Brand: MEGADYNE

## (undated) DEVICE — Device: Brand: SPOT EX ENDOSCOPIC TATTOO

## (undated) DEVICE — SUTURE VCRL SZ 0 L27IN ABSRB UD L36MM CT-1 1/2 CIR J260H

## (undated) DEVICE — SEALER/DIVIDER LAP SHFT L37CM JAW APER 11.4MM 315DEG ROT

## (undated) DEVICE — SUTURE VCRL SZ 3-0 L18IN ABSRB UD L26MM SH 1/2 CIR J864D

## (undated) DEVICE — SURE SET-DOUBLE BASIN-LF: Brand: MEDLINE INDUSTRIES, INC.

## (undated) DEVICE — RELOAD STPL L75MM OPN H3.8MM CLS 1.5MM WIRE DIA0.2MM REG

## (undated) DEVICE — SURGICAL SET UP - SURE SET: Brand: MEDLINE INDUSTRIES, INC.

## (undated) DEVICE — TRAY CATHETER 16FR F INCLUDE BARDX IC COMPLT CARE DRNGE BG

## (undated) DEVICE — NEEDLE HYPO 22GA L1.5IN BLK POLYPR HUB S STL REG BVL STR

## (undated) DEVICE — 3M™ IOBAN™ 2 ANTIMICROBIAL INCISE DRAPE 6648EZ: Brand: IOBAN™ 2

## (undated) DEVICE — SNARE ENDOSCP AD L240CM LOOP W10MM SHTH DIA2.4MM RND INSUL

## (undated) DEVICE — COVER LT HNDL BLU PLAS

## (undated) DEVICE — [HIGH FLOW INSUFFLATOR,  DO NOT USE IF PACKAGE IS DAMAGED,  KEEP DRY,  KEEP AWAY FROM SUNLIGHT,  PROTECT FROM HEAT AND RADIOACTIVE SOURCES.]: Brand: PNEUMOSURE

## (undated) DEVICE — TROCAR: Brand: KII FIOS FIRST ENTRY

## (undated) DEVICE — SUTURE VCRL SZ 2-0 L12X18IN ABSRB UD POLYGLACTIN 910 BRAID J911T

## (undated) DEVICE — SOLUTION INJ LR VISIV 1000ML BG

## (undated) DEVICE — DRAPE,LAP,CHOLE,W/TROUGHS,STERILE: Brand: MEDLINE

## (undated) DEVICE — STAPLER INT L75MM CUT LN L73MM STPL LN L77MM BLU B FRM 8

## (undated) DEVICE — JEWISH HOSPITAL TURNOVER KIT: Brand: MEDLINE INDUSTRIES, INC.

## (undated) DEVICE — GLOVE SURG SZ 7 CRM LTX FREE POLYISOPRENE POLYMER BEAD ANTI

## (undated) DEVICE — SPONGE,LAP,18"X18",DLX,XR,ST,5/PK,40/PK: Brand: MEDLINE

## (undated) DEVICE — SUTURE MCRYL SZ 4-0 L27IN ABSRB UD L19MM PS-2 1/2 CIR PRIM Y426H

## (undated) DEVICE — INTENDED FOR TISSUE SEPARATION, AND OTHER PROCEDURES THAT REQUIRE A SHARP SURGICAL BLADE TO PUNCTURE OR CUT.: Brand: BARD-PARKER ® CARBON RIB-BACK BLADES

## (undated) DEVICE — WOUND RETRACTOR AND PROTECTOR: Brand: ALEXIS WOUND PROTECTOR-RETRACTOR

## (undated) DEVICE — SUTURE VCRL SZ 0 L54IN ABSRB VLT W/O NDL POLYGLACTIN 910 J616H

## (undated) DEVICE — THE ARTICULATOR INJECTION NEEDLE IS A SINGLE USE, DISPOSABLE, FLEXIBLE SHEATH INJECTION NEEDLE USED FOR THE INJECTION OF VARIOUS TYPES OF MEDIA THROUGH FLEXIBLE ENDOSCOPES.: Brand: ARTICULATOR

## (undated) DEVICE — ADHESIVE SKIN CLSR 0.7ML TOP DERMBND ADV

## (undated) DEVICE — SOLUTION ANTIFOG VIS SYS CLEARIFY LAPSCP

## (undated) DEVICE — PUMP SUC IRR TBNG L10FT W/ HNDPC ASSEMB STRYKEFLOW 2